# Patient Record
Sex: MALE | Race: WHITE | HISPANIC OR LATINO
[De-identification: names, ages, dates, MRNs, and addresses within clinical notes are randomized per-mention and may not be internally consistent; named-entity substitution may affect disease eponyms.]

---

## 2017-03-02 ENCOUNTER — APPOINTMENT (OUTPATIENT)
Dept: FAMILY MEDICINE | Facility: CLINIC | Age: 47
End: 2017-03-02

## 2017-12-12 ENCOUNTER — APPOINTMENT (OUTPATIENT)
Dept: FAMILY MEDICINE | Facility: CLINIC | Age: 47
End: 2017-12-12
Payer: COMMERCIAL

## 2017-12-12 VITALS
SYSTOLIC BLOOD PRESSURE: 129 MMHG | DIASTOLIC BLOOD PRESSURE: 74 MMHG | OXYGEN SATURATION: 98 % | HEART RATE: 88 BPM | BODY MASS INDEX: 30.29 KG/M2 | WEIGHT: 193 LBS | TEMPERATURE: 98.2 F | HEIGHT: 67 IN

## 2017-12-12 DIAGNOSIS — E66.3 OVERWEIGHT: ICD-10-CM

## 2017-12-12 DIAGNOSIS — M79.662 PAIN IN LEFT LOWER LEG: ICD-10-CM

## 2017-12-12 DIAGNOSIS — B35.3 TINEA PEDIS: ICD-10-CM

## 2017-12-12 DIAGNOSIS — L97.529 NON-PRESSURE CHRONIC ULCER OF OTHER PART OF LEFT FOOT WITH UNSPECIFIED SEVERITY: ICD-10-CM

## 2017-12-12 PROCEDURE — 99214 OFFICE O/P EST MOD 30 MIN: CPT | Mod: 25

## 2017-12-12 PROCEDURE — 90471 IMMUNIZATION ADMIN: CPT

## 2017-12-12 PROCEDURE — 90715 TDAP VACCINE 7 YRS/> IM: CPT

## 2017-12-12 PROCEDURE — 99396 PREV VISIT EST AGE 40-64: CPT | Mod: 25

## 2017-12-12 PROCEDURE — 93000 ELECTROCARDIOGRAM COMPLETE: CPT

## 2018-01-21 ENCOUNTER — FORM ENCOUNTER (OUTPATIENT)
Age: 48
End: 2018-01-21

## 2018-01-22 ENCOUNTER — APPOINTMENT (OUTPATIENT)
Dept: ULTRASOUND IMAGING | Facility: CLINIC | Age: 48
End: 2018-01-22
Payer: COMMERCIAL

## 2018-01-22 ENCOUNTER — OUTPATIENT (OUTPATIENT)
Dept: OUTPATIENT SERVICES | Facility: HOSPITAL | Age: 48
LOS: 1 days | End: 2018-01-22
Payer: COMMERCIAL

## 2018-01-22 DIAGNOSIS — M54.9 DORSALGIA, UNSPECIFIED: ICD-10-CM

## 2018-01-22 DIAGNOSIS — R07.9 CHEST PAIN, UNSPECIFIED: ICD-10-CM

## 2018-01-22 DIAGNOSIS — R10.13 EPIGASTRIC PAIN: ICD-10-CM

## 2018-01-22 PROCEDURE — 71046 X-RAY EXAM CHEST 2 VIEWS: CPT

## 2018-01-22 PROCEDURE — 76700 US EXAM ABDOM COMPLETE: CPT

## 2018-01-22 PROCEDURE — 72070 X-RAY EXAM THORAC SPINE 2VWS: CPT

## 2018-01-22 PROCEDURE — 71046 X-RAY EXAM CHEST 2 VIEWS: CPT | Mod: 26

## 2018-01-22 PROCEDURE — 72070 X-RAY EXAM THORAC SPINE 2VWS: CPT | Mod: 26

## 2018-01-22 PROCEDURE — 76700 US EXAM ABDOM COMPLETE: CPT | Mod: 26

## 2018-02-01 ENCOUNTER — APPOINTMENT (OUTPATIENT)
Dept: FAMILY MEDICINE | Facility: CLINIC | Age: 48
End: 2018-02-01

## 2018-03-08 ENCOUNTER — APPOINTMENT (OUTPATIENT)
Dept: FAMILY MEDICINE | Facility: CLINIC | Age: 48
End: 2018-03-08
Payer: COMMERCIAL

## 2018-03-08 VITALS
HEART RATE: 80 BPM | TEMPERATURE: 97.8 F | OXYGEN SATURATION: 97 % | DIASTOLIC BLOOD PRESSURE: 78 MMHG | BODY MASS INDEX: 30.29 KG/M2 | SYSTOLIC BLOOD PRESSURE: 137 MMHG | HEIGHT: 67 IN | WEIGHT: 193 LBS

## 2018-03-08 DIAGNOSIS — R94.31 ABNORMAL ELECTROCARDIOGRAM [ECG] [EKG]: ICD-10-CM

## 2018-03-08 DIAGNOSIS — M54.9 DORSALGIA, UNSPECIFIED: ICD-10-CM

## 2018-03-08 DIAGNOSIS — Z23 ENCOUNTER FOR IMMUNIZATION: ICD-10-CM

## 2018-03-08 DIAGNOSIS — Z87.19 PERSONAL HISTORY OF OTHER DISEASES OF THE DIGESTIVE SYSTEM: ICD-10-CM

## 2018-03-08 PROCEDURE — 99214 OFFICE O/P EST MOD 30 MIN: CPT

## 2018-03-10 PROBLEM — Z23 NEED FOR TDAP VACCINATION: Status: RESOLVED | Noted: 2017-12-12 | Resolved: 2018-03-10

## 2018-03-10 PROBLEM — M54.9 UPPER BACK PAIN: Status: RESOLVED | Noted: 2017-12-12 | Resolved: 2018-03-10

## 2018-03-10 PROBLEM — Z87.19 HISTORY OF EPIGASTRIC PAIN: Status: RESOLVED | Noted: 2017-12-12 | Resolved: 2018-03-10

## 2018-06-15 ENCOUNTER — APPOINTMENT (OUTPATIENT)
Dept: INTERNAL MEDICINE | Facility: CLINIC | Age: 48
End: 2018-06-15

## 2018-10-02 ENCOUNTER — APPOINTMENT (OUTPATIENT)
Dept: INTERNAL MEDICINE | Facility: CLINIC | Age: 48
End: 2018-10-02

## 2018-12-17 ENCOUNTER — APPOINTMENT (OUTPATIENT)
Dept: FAMILY MEDICINE | Facility: CLINIC | Age: 48
End: 2018-12-17
Payer: COMMERCIAL

## 2018-12-17 VITALS
HEART RATE: 86 BPM | WEIGHT: 192 LBS | HEIGHT: 67 IN | TEMPERATURE: 98 F | OXYGEN SATURATION: 99 % | SYSTOLIC BLOOD PRESSURE: 112 MMHG | DIASTOLIC BLOOD PRESSURE: 68 MMHG | BODY MASS INDEX: 30.13 KG/M2

## 2018-12-17 DIAGNOSIS — H92.01 OTALGIA, RIGHT EAR: ICD-10-CM

## 2018-12-17 DIAGNOSIS — Z87.898 PERSONAL HISTORY OF OTHER SPECIFIED CONDITIONS: ICD-10-CM

## 2018-12-17 DIAGNOSIS — M25.561 PAIN IN RIGHT KNEE: ICD-10-CM

## 2018-12-17 DIAGNOSIS — Z87.19 PERSONAL HISTORY OF OTHER DISEASES OF THE DIGESTIVE SYSTEM: ICD-10-CM

## 2018-12-17 DIAGNOSIS — H54.7 UNSPECIFIED VISUAL LOSS: ICD-10-CM

## 2018-12-17 PROCEDURE — 99214 OFFICE O/P EST MOD 30 MIN: CPT

## 2018-12-17 RX ORDER — AMOXICILLIN AND CLAVULANATE POTASSIUM 875; 125 MG/1; MG/1
875-125 TABLET, COATED ORAL
Qty: 14 | Refills: 0 | Status: COMPLETED | COMMUNITY
Start: 2018-03-08 | End: 2018-12-17

## 2018-12-17 NOTE — REVIEW OF SYSTEMS
[Heartburn] : heartburn [Muscle Pain] : muscle pain [Back Pain] : back pain [Negative] : Neurological [FreeTextEntry9] : see HPI

## 2018-12-17 NOTE — PHYSICAL EXAM
[No Acute Distress] : no acute distress [Well Nourished] : well nourished [Well Developed] : well developed [Well-Appearing] : well-appearing [No Respiratory Distress] : no respiratory distress  [Clear to Auscultation] : lungs were clear to auscultation bilaterally [No Accessory Muscle Use] : no accessory muscle use [Normal Rate] : normal rate  [Regular Rhythm] : with a regular rhythm [Normal S1, S2] : normal S1 and S2 [No Murmur] : no murmur heard [Soft] : abdomen soft [Non Tender] : non-tender [Non-distended] : non-distended [No Masses] : no abdominal mass palpated [No HSM] : no HSM [Normal Bowel Sounds] : normal bowel sounds [No Rash] : no rash [Normal Gait] : normal gait [Coordination Grossly Intact] : coordination grossly intact [de-identified] : obese [de-identified] : There is spinal point tenderness at L2-L3, no paraspinal musculature tenderness, SLR test negative, no pain elicited in LE extension/flexion/rotation. [de-identified] : Pain elicited by pressing at insertion site oF SCM muscle to Mastoid process L>R.

## 2018-12-17 NOTE — HISTORY OF PRESENT ILLNESS
[FreeTextEntry8] : Presents complaining  2 month h/o pain in the area immediately below the ears described as pinch like pain rated 5/10 when worse, felt not associated with any particular movement, has tried using one pillow instead of two with no real relief. Pt denies any otorrhea, denies any trauma. Pt does not take any medications, states that the pain comes and goes.\par Pt also complaining of pain in the calf muscles L>R in the last 2 months. 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 Pt feels relief when elevating his feet.\par Pt also complains of chronic low back pain which also comes and goes, pain rated 8/10 when worse, takes Advil Gel caps OTC (2 caps). Pt received in the past injection in the back (10 yr ago).

## 2018-12-17 NOTE — HEALTH RISK ASSESSMENT
[No falls in past year] : Patient reported no falls in the past year [0] : 2) Feeling down, depressed, or hopeless: Not at all (0) [] : No [de-identified] : social [[id number]] : 0

## 2019-04-03 ENCOUNTER — APPOINTMENT (OUTPATIENT)
Dept: FAMILY MEDICINE | Facility: CLINIC | Age: 49
End: 2019-04-03

## 2019-04-03 ENCOUNTER — APPOINTMENT (OUTPATIENT)
Dept: FAMILY MEDICINE | Facility: CLINIC | Age: 49
End: 2019-04-03
Payer: COMMERCIAL

## 2019-04-03 VITALS
TEMPERATURE: 97.5 F | BODY MASS INDEX: 29.19 KG/M2 | OXYGEN SATURATION: 97 % | WEIGHT: 186 LBS | RESPIRATION RATE: 18 BRPM | SYSTOLIC BLOOD PRESSURE: 115 MMHG | DIASTOLIC BLOOD PRESSURE: 75 MMHG | HEIGHT: 67 IN | HEART RATE: 88 BPM

## 2019-04-03 PROCEDURE — 99213 OFFICE O/P EST LOW 20 MIN: CPT

## 2019-04-09 NOTE — HISTORY OF PRESENT ILLNESS
[FreeTextEntry1] : medication refills [de-identified] : Pt presents for prescription refills, no new complains.

## 2019-04-09 NOTE — HEALTH RISK ASSESSMENT
[No falls in past year] : Patient reported no falls in the past year [0] : 2) Feeling down, depressed, or hopeless: Not at all (0) [] : No [de-identified] : social [[id number]] : 0

## 2019-04-09 NOTE — ASSESSMENT
[FreeTextEntry1] : will add Tizanidine for muscle spasm and renew Ibuprofen 800 mg tid prn\par Continue Omeprazole 40 mg daily\par \par Pt will return to the office for cpe, will give order for blood work to get done before appointment and to review at the time of his visit.\par \par HCM:\par CPE 12/12/2017\par EKG 12/12/2017\par Pt refused Flu vaccine.\par Tdap: 12//12/2017\par HIV testing consented\par Hep C testing consented\par F/U 3 months

## 2019-04-09 NOTE — PHYSICAL EXAM
[Well Nourished] : well nourished [No Acute Distress] : no acute distress [Well Developed] : well developed [No JVD] : no jugular venous distention [Well-Appearing] : well-appearing [Supple] : supple [No Lymphadenopathy] : no lymphadenopathy [Thyroid Normal, No Nodules] : the thyroid was normal and there were no nodules present [No Respiratory Distress] : no respiratory distress  [Clear to Auscultation] : lungs were clear to auscultation bilaterally [Normal Rate] : normal rate  [No Accessory Muscle Use] : no accessory muscle use [Regular Rhythm] : with a regular rhythm [Normal S1, S2] : normal S1 and S2 [Soft] : abdomen soft [No Murmur] : no murmur heard [Non Tender] : non-tender [Non-distended] : non-distended [de-identified] : decreased ROM of lumbar spine with increased tone of lumbar paraspinal musculature

## 2019-04-09 NOTE — COUNSELING
[Weight management counseling provided] : Weight management [Activity counseling provided] : activity [Healthy eating counseling provided] : healthy eating [___ min/wk activity recommended] : [unfilled] min/wk activity recommended [None] : None [Good understanding] : Patient has a good understanding of lifestyle changes and the steps needed to achieve self management goals

## 2019-04-17 ENCOUNTER — APPOINTMENT (OUTPATIENT)
Dept: FAMILY MEDICINE | Facility: CLINIC | Age: 49
End: 2019-04-17
Payer: COMMERCIAL

## 2019-05-01 ENCOUNTER — APPOINTMENT (OUTPATIENT)
Dept: FAMILY MEDICINE | Facility: CLINIC | Age: 49
End: 2019-05-01
Payer: COMMERCIAL

## 2019-05-01 VITALS
BODY MASS INDEX: 29.45 KG/M2 | OXYGEN SATURATION: 95 % | DIASTOLIC BLOOD PRESSURE: 71 MMHG | SYSTOLIC BLOOD PRESSURE: 115 MMHG | RESPIRATION RATE: 15 BRPM | HEART RATE: 78 BPM | WEIGHT: 188 LBS

## 2019-05-01 DIAGNOSIS — Z87.19 PERSONAL HISTORY OF OTHER DISEASES OF THE DIGESTIVE SYSTEM: ICD-10-CM

## 2019-05-01 DIAGNOSIS — Z11.4 ENCOUNTER FOR SCREENING FOR HUMAN IMMUNODEFICIENCY VIRUS [HIV]: ICD-10-CM

## 2019-05-01 LAB
GLUCOSE BLDC GLUCOMTR-MCNC: 94
HBA1C MFR BLD HPLC: 6.5

## 2019-05-01 PROCEDURE — 99396 PREV VISIT EST AGE 40-64: CPT | Mod: 25

## 2019-05-01 PROCEDURE — 82947 ASSAY GLUCOSE BLOOD QUANT: CPT | Mod: QW

## 2019-05-01 PROCEDURE — 83036 HEMOGLOBIN GLYCOSYLATED A1C: CPT | Mod: QW

## 2019-05-01 NOTE — HEALTH RISK ASSESSMENT
[Good] : ~his/her~  mood as  good [No falls in past year] : Patient reported no falls in the past year [0] : 2) Feeling down, depressed, or hopeless: Not at all (0) [With Family] : lives with family [None] : None [Employed] : employed [# of Members in Household ___] :  household currently consist of [unfilled] member(s) [High School] : high school [] :  [# Of Children ___] : has [unfilled] children [Sexually Active] : sexually active [Neglect Or Abandonment] : neglect or abandonment [Fully functional (bathing, dressing, toileting, transferring, walking, feeding)] : Fully functional (bathing, dressing, toileting, transferring, walking, feeding) [Feels Safe at Home] : Feels safe at home [Fully functional (using the telephone, shopping, preparing meals, housekeeping, doing laundry, using] : Fully functional and needs no help or supervision to perform IADLs (using the telephone, shopping, preparing meals, housekeeping, doing laundry, using transportation, managing medications and managing finances) [Smoke Detector] : smoke detector [Carbon Monoxide Detector] : carbon monoxide detector [Seat Belt] :  uses seat belt [Patient/Caregiver unclear of wishes] : Patient/Caregiver unclear of wishes [] : No [de-identified] : None [de-identified] : occasional whiskey 1 glass every week [de-identified] : Regular [Change in mental status noted] : No change in mental status noted [[id number]] : 0 [Behavior] : denies difficulty with behavior [Learning/Retaining New Information] : denies difficulty learning/retaining new information [Language] : denies difficulty with language [Reasoning] : denies difficulty with reasoning [Handling Complex Tasks] : denies difficulty handling complex tasks [High Risk Behavior] : no high risk behavior [Spatial Ability and Orientation] : denies difficulty with spatial ability and orientation [Reports changes in hearing] : Reports no changes in hearing [Reports normal functional visual acuity (ie: able to read med bottle)] : Reports poor functional visual acuity.  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

## 2019-05-01 NOTE — COUNSELING
[Healthy eating counseling provided] : healthy eating [Weight management counseling provided] : Weight management [Activity counseling provided] : activity [Low Fat Diet] : Low fat diet [Low Salt Diet] : Low salt diet [___ min/wk activity recommended] : [unfilled] min/wk activity recommended [Good understanding] : Patient has a good understanding of lifestyle changes and the steps needed to achieve self management goals [None] : None

## 2019-05-01 NOTE — ASSESSMENT
[FreeTextEntry1] : This is a 50 y/o male with PMHx significant for chronic low back pain, GERD, HLD, Obesity, Vitamin D insufficiency, presenting for CPE. Pt had blood work done 3 weeks ago in preparation for exam.\par \par MSK: Bilateral neck pain\par -Resolved, most likely muscle strain from head positioning at sleeping time.\par -Rx refill for Tizanidine was sent to pharmacy.\par -C-spine Xray ordered but never done..\par -L-spine Xray ordered, not done.\par \par GERD/Epigastric pain: h/o +H. Pylori\par -s/p 2 week trial of Omeprazole\par -Dietary changes discussed.\par -Doing well after Omeprazole trial.\par \par Obesity/ Pre-DM:\par -HgA1c 5/7 1/2018, 5.6 4/19, non-fasting BS 98.\par -Weight loss encouraged\par -Dietary changes discussed.\par \par [facility]: HLD\par -Lipid panel wnl\par -Advised low fat/low cholesterol diet and weight loss\par \par F/E/N: Vitamin D defficiency\par -Recommend increase Vitamin D oral intake.\par \par HCM:\par -EKG 12/12/2017\par -Depression screening: negative.\par -Pt refuses Flu vaccine.\par -Tdap: 12//12/2017\par -HIV testing offered: he declined testing 12/12/2017\par -F/U prn

## 2019-05-01 NOTE — HISTORY OF PRESENT ILLNESS
[de-identified] : This is a 50 y/o male with PMHx significant for chronic low back pain, GERD, HLD, Obesity, Vitamin D insufficiency, presenting for CPE. Pt had blood work done 3 weeks ago in preparation for exam. Pt offers no new complains. [FreeTextEntry1] : physical exam

## 2019-05-01 NOTE — PHYSICAL EXAM
[No Acute Distress] : no acute distress [Well Nourished] : well nourished [Well Developed] : well developed [Well-Appearing] : well-appearing [Normal Sclera/Conjunctiva] : normal sclera/conjunctiva [Normal Outer Ear/Nose] : the outer ears and nose were normal in appearance [EOMI] : extraocular movements intact [PERRL] : pupils equal round and reactive to light [Supple] : supple [No JVD] : no jugular venous distention [Normal Oropharynx] : the oropharynx was normal [No Lymphadenopathy] : no lymphadenopathy [No Respiratory Distress] : no respiratory distress  [Thyroid Normal, No Nodules] : the thyroid was normal and there were no nodules present [Normal Rate] : normal rate  [Clear to Auscultation] : lungs were clear to auscultation bilaterally [No Accessory Muscle Use] : no accessory muscle use [No Murmur] : no murmur heard [Normal S1, S2] : normal S1 and S2 [Regular Rhythm] : with a regular rhythm [No Varicosities] : no varicosities [No Carotid Bruits] : no carotid bruits [No Abdominal Bruit] : a ~M bruit was not heard ~T in the abdomen [Pedal Pulses Present] : the pedal pulses are present [No Extremity Clubbing/Cyanosis] : no extremity clubbing/cyanosis [No Edema] : there was no peripheral edema [Soft] : abdomen soft [No Palpable Aorta] : no palpable aorta [Non-distended] : non-distended [Non Tender] : non-tender [No HSM] : no HSM [Normal Bowel Sounds] : normal bowel sounds [Declined Rectal Exam] : declined rectal exam [No Masses] : no abdominal mass palpated [Normal Posterior Cervical Nodes] : no posterior cervical lymphadenopathy [No CVA Tenderness] : no CVA  tenderness [Normal Anterior Cervical Nodes] : no anterior cervical lymphadenopathy [No Joint Swelling] : no joint swelling [No Spinal Tenderness] : no spinal tenderness [No Rash] : no rash [Normal Gait] : normal gait [Grossly Normal Strength/Tone] : grossly normal strength/tone [Normal Affect] : the affect was normal [Deep Tendon Reflexes (DTR)] : deep tendon reflexes were 2+ and symmetric [Coordination Grossly Intact] : coordination grossly intact [No Focal Deficits] : no focal deficits [Normal Insight/Judgement] : insight and judgment were intact

## 2019-06-04 ENCOUNTER — MEDICATION RENEWAL (OUTPATIENT)
Age: 49
End: 2019-06-04

## 2019-06-27 ENCOUNTER — APPOINTMENT (OUTPATIENT)
Dept: FAMILY MEDICINE | Facility: CLINIC | Age: 49
End: 2019-06-27
Payer: COMMERCIAL

## 2019-06-27 VITALS
HEART RATE: 96 BPM | DIASTOLIC BLOOD PRESSURE: 81 MMHG | TEMPERATURE: 97.5 F | BODY MASS INDEX: 29.03 KG/M2 | WEIGHT: 185 LBS | HEIGHT: 67 IN | SYSTOLIC BLOOD PRESSURE: 129 MMHG | OXYGEN SATURATION: 98 %

## 2019-06-27 DIAGNOSIS — Z11.59 ENCOUNTER FOR SCREENING FOR OTHER VIRAL DISEASES: ICD-10-CM

## 2019-06-27 DIAGNOSIS — Z86.39 PERSONAL HISTORY OF OTHER ENDOCRINE, NUTRITIONAL AND METABOLIC DISEASE: ICD-10-CM

## 2019-06-27 LAB
BILIRUB UR QL STRIP: NORMAL
GLUCOSE UR-MCNC: NORMAL
HCG UR QL: 0.2 EU/DL
HGB UR QL STRIP.AUTO: NORMAL
KETONES UR-MCNC: NORMAL
LEUKOCYTE ESTERASE UR QL STRIP: NORMAL
NITRITE UR QL STRIP: NORMAL
PH UR STRIP: 6.5
PROT UR STRIP-MCNC: NORMAL
SP GR UR STRIP: 1.02

## 2019-06-27 PROCEDURE — 81003 URINALYSIS AUTO W/O SCOPE: CPT | Mod: QW

## 2019-06-27 PROCEDURE — 99213 OFFICE O/P EST LOW 20 MIN: CPT | Mod: 25

## 2019-06-27 RX ORDER — IBUPROFEN 800 MG/1
800 TABLET, FILM COATED ORAL 3 TIMES DAILY
Qty: 30 | Refills: 1 | Status: DISCONTINUED | COMMUNITY
Start: 2018-12-17 | End: 2019-06-27

## 2019-06-27 NOTE — PHYSICAL EXAM
[No Acute Distress] : no acute distress [Well Nourished] : well nourished [Well Developed] : well developed [Well-Appearing] : well-appearing [No Respiratory Distress] : no respiratory distress  [Clear to Auscultation] : lungs were clear to auscultation bilaterally [Normal Rate] : normal rate  [No Accessory Muscle Use] : no accessory muscle use [Regular Rhythm] : with a regular rhythm [Normal S1, S2] : normal S1 and S2 [Soft] : abdomen soft [No Murmur] : no murmur heard [Flat] : flat [Suprapubic] : in the suprapubic area [No Mass] : no masses were palpated [Urethral Meatus] : meatus normal [Penis Abnormality] : normal uncircumcised penis [Scrotum] : the scrotum was normal [FreeTextEntry1] : Slight LEFT testicular tenderness, + periepididymitis of RIGHT testicle.

## 2019-06-27 NOTE — ASSESSMENT
[FreeTextEntry1] : This is a 48 y/o male with PMHx significant for chronic low back pain, GERD, HLD, Obesity, Vitamin D insufficiency, presenting c/o suprapubic / groin / testicular pain.\par \par : c/o suprapubic / groin / testicular pain.\par -Pelvic CT scan to r/o hernia\par -UA negative\par -Naproxen 500 mg for pain, do not take Ibuprofen.\par \par MSK: Bilateral neck pain\par -Resolved, most likely muscle strain from head positioning at sleeping time.\par -Rx refill for Tizanidine was sent to pharmacy.\par -C-spine Xray ordered but never done..\par -L-spine Xray ordered, not done.\par \par GERD/Epigastric pain: h/o +H. Pylori\par -s/p 2 week trial of Omeprazole\par -Dietary changes discussed.\par -Doing well after Omeprazole trial.\par \par Obesity/ Pre-DM:\par -HgA1c 5/7 1/2018, 5.6 4/19, non-fasting BS 98.\par -Weight loss encouraged\par -Dietary changes discussed.\par \par [facility]: HLD\par -Lipid panel wnl\par -Advised low fat/low cholesterol diet and weight loss\par \par F/E/N: Vitamin D deficiency\par -Recommend increase Vitamin D oral intake.\par \par HCM:\par -EKG 12/12/2017\par -Depression screening: negative.\par -Pt refuses Flu vaccine.\par -Tdap: 12//12/2017\par -HIV testing offered: he declined testing 12/12/2017\par -F/U prn

## 2019-06-27 NOTE — HEALTH RISK ASSESSMENT
[Monthly or less (1 pt)] : Monthly or less (1 point) [No] : In the past 12 months have you used drugs other than those required for medical reasons? No [Never (0 pts)] : Never (0 points) [1 or 2 (0 pts)] : 1 or 2 (0 points) [0] : 2) Feeling down, depressed, or hopeless: Not at all (0) [No falls in past year] : Patient reported no falls in the past year [] : No [de-identified] : occasional whiskey 1 glass every week [Audit-CScore] : 1 [de-identified] : None [[id number]] : 0 [de-identified] : Regular

## 2019-06-27 NOTE — HISTORY OF PRESENT ILLNESS
[FreeTextEntry8] : This is a 48 y/o male with PMHx significant for chronic low back pain, GERD, HLD, Obesity, Vitamin D insufficiency, presenting c/o 2 week h/o suprapubic pain, rated 7/10 with no aggravating or relieving factors. Pt denies any heavy lifting, no urinary symptoms, denies any sy hematuria. Pt admits to Testicular pain at the time of the suprapubic pain which lasted 2 days, today with similar complains.

## 2019-06-27 NOTE — COUNSELING
[Weight management counseling provided] : Weight management [Healthy eating counseling provided] : healthy eating [Activity counseling provided] : activity [Low Fat Diet] : Low fat diet [Low Salt Diet] : Low salt diet [None] : None

## 2019-08-21 ENCOUNTER — APPOINTMENT (OUTPATIENT)
Dept: FAMILY MEDICINE | Facility: CLINIC | Age: 49
End: 2019-08-21
Payer: COMMERCIAL

## 2019-08-21 VITALS
WEIGHT: 185 LBS | TEMPERATURE: 98.4 F | HEIGHT: 67 IN | OXYGEN SATURATION: 97 % | HEART RATE: 86 BPM | DIASTOLIC BLOOD PRESSURE: 66 MMHG | SYSTOLIC BLOOD PRESSURE: 102 MMHG | BODY MASS INDEX: 29.03 KG/M2

## 2019-08-21 PROCEDURE — 99213 OFFICE O/P EST LOW 20 MIN: CPT

## 2019-08-21 NOTE — ASSESSMENT
[FreeTextEntry1] : This is a 50 y/o male with PMHx significant for chronic low back pain, GERD, HLD, Obesity, Vitamin D insufficiency, presenting c/o neck pain.\par \par : c/o suprapubic / groin / testicular pain.\par -Pelvic CT scan to r/o hernia was denied by insurance\par -UA negative\par -Naproxen 500 mg for pain, which resolved.\par \par MSK: Bilateral neck pain\par -Resolved, most likely muscle strain from head positioning at sleeping time / weight lifting training.\par -Rx refill for Tizanidine was sent to pharmacy as well as Naproxen refill.\par -C-spine Xray ordered but never done.\par -L-spine Xray ordered, not done.\par \par GERD/Epigastric pain: h/o +H. Pylori\par -s/p 2 week trial of Omeprazole\par -Dietary changes discussed.\par -Doing well after Omeprazole trial.\par \par Obesity/ Pre-DM:\par -HgA1c 5/7 1/2018, 5.6 4/19, non-fasting BS 98.\par -Weight loss encouraged\par -Dietary changes discussed.\par \par [facility]: HLD\par -Lipid panel wnl\par -Advised low fat/low cholesterol diet and weight loss\par \par F/E/N: Vitamin D deficiency\par -Recommend increase Vitamin D oral intake.\par \par HCM:\par -EKG 12/12/2017\par -Depression screening: negative.\par -Pt refuses Flu vaccine.\par -Tdap: 12//12/2017\par -HIV testing offered: he declined testing 12/12/2017\par -F/U prn

## 2019-08-21 NOTE — HISTORY OF PRESENT ILLNESS
[FreeTextEntry8] : c/o 2 week h/o neck pain which started after initiating weight training. Pt denies any trauma, pain not radiating, denies any nausea, dizziness, headaches. Pain rated 10/10 when worse but has been diminishing steadily down to 3/10 worsening when turning head laterally. Pt had been taking muscle relaxant with good relief.

## 2019-08-21 NOTE — PHYSICAL EXAM
[Normal] : no rash [de-identified] : slight increase muscle tone in neck musculature, FROM with minimal degree of pain / discomfort on head lateral rotation.

## 2019-08-21 NOTE — COUNSELING
[Weight management counseling provided] : Weight management [Healthy eating counseling provided] : healthy eating [Activity counseling provided] : activity [Low Salt Diet] : Low salt diet [Low Fat Diet] : Low fat diet [None] : None

## 2019-08-21 NOTE — HEALTH RISK ASSESSMENT
[Monthly or less (1 pt)] : Monthly or less (1 point) [1 or 2 (0 pts)] : 1 or 2 (0 points) [Never (0 pts)] : Never (0 points) [No falls in past year] : Patient reported no falls in the past year [No] : In the past 12 months have you used drugs other than those required for medical reasons? No [0] : 1) Little interest or pleasure doing things: Not at all (0) [] : No [de-identified] : occasional whiskey 1 glass every week [Audit-CScore] : 1 [de-identified] : None [de-identified] : Regular [[id number]] : 0

## 2020-06-02 ENCOUNTER — APPOINTMENT (OUTPATIENT)
Dept: FAMILY MEDICINE | Facility: CLINIC | Age: 50
End: 2020-06-02
Payer: MEDICAID

## 2020-06-02 VITALS
DIASTOLIC BLOOD PRESSURE: 92 MMHG | WEIGHT: 200 LBS | OXYGEN SATURATION: 98 % | HEIGHT: 67 IN | BODY MASS INDEX: 31.39 KG/M2 | TEMPERATURE: 97.6 F | SYSTOLIC BLOOD PRESSURE: 144 MMHG | HEART RATE: 82 BPM

## 2020-06-02 DIAGNOSIS — R10.32 LEFT LOWER QUADRANT PAIN: ICD-10-CM

## 2020-06-02 DIAGNOSIS — R10.2 PELVIC AND PERINEAL PAIN: ICD-10-CM

## 2020-06-02 DIAGNOSIS — M54.5 LOW BACK PAIN: ICD-10-CM

## 2020-06-02 DIAGNOSIS — G89.29 LOW BACK PAIN: ICD-10-CM

## 2020-06-02 DIAGNOSIS — M54.2 CERVICALGIA: ICD-10-CM

## 2020-06-02 PROCEDURE — 36415 COLL VENOUS BLD VENIPUNCTURE: CPT

## 2020-06-02 PROCEDURE — 99214 OFFICE O/P EST MOD 30 MIN: CPT | Mod: 25

## 2020-06-02 RX ORDER — TIZANIDINE 2 MG/1
2 TABLET ORAL
Qty: 15 | Refills: 1 | Status: COMPLETED | COMMUNITY
Start: 2019-04-03 | End: 2020-06-02

## 2020-06-02 NOTE — HEALTH RISK ASSESSMENT
[1 or 2 (0 pts)] : 1 or 2 (0 points) [Monthly or less (1 pt)] : Monthly or less (1 point) [Never (0 pts)] : Never (0 points) [No] : In the past 12 months have you used drugs other than those required for medical reasons? No [No falls in past year] : Patient reported no falls in the past year [0] : 2) Feeling down, depressed, or hopeless: Not at all (0) [] : No [de-identified] : occasional whiskey 1 glass every week [de-identified] : None [Audit-CScore] : 1 [de-identified] : Regular [[id number]] : 0

## 2020-06-02 NOTE — ASSESSMENT
[FreeTextEntry1] : This is a 50 y/o male with PMHx significant for chronic low back pain, GERD, HLD, Obesity, Vitamin D insufficiency, presenting c/o .\par \par : abdominal / epigastric pain\par -Basically resolved\par -Omeprazole 40 mg qd x 2 weeks ordered.\par -Dietary changes, no eating after 3 hours prior to bedtime.\par \par MSK: LEFT Knee pain, LEFT finger mass.\par -Knee pain positional, recommend to not cross knees to avoid unnecessary stress to knee.\par -RIGHT hand Xray to r/o foreign object vs mass.\par \par Obesity/ Pre-DM:\par -HgA1c 5/7 1/2018, 5.6 4/19, non-fasting BS 98.\par -Will check A1c with Estimated Average Glucose\par -Weight loss encouraged\par -Dietary changes discussed.\par \par [facility]: HLD\par -Lipid panel fasting ordered today.\par -Advised low fat/low cholesterol diet and weight loss\par \par F/E/N: Vitamin D deficiency\par -Recommend increase Vitamin D oral intake.\par -Will check vitamin D level today.\par \par HCM:\par -EKG 12/12/2017\par -Depression screening: negative.\par -Pt refused Flu vaccine.\par -Tdap: 12//12/2017\par -HIV testing offered: he declined testing 12/12/2017\par -Covid-19 Ab testing today in office.

## 2020-06-02 NOTE — HISTORY OF PRESENT ILLNESS
[FreeTextEntry8] : Presents c/o abdominal pain in the epigastric area, pain/burning sensation, took medication from [place] which resolved the problem for a couple of days, took Catherine [place] also with relief, no further pain x 3 days which coincide with him going back to work. Pt also complaining of "a mass" at the base of the RIGHT fourth digit, non painful. Pt also c/o RIGHT knee pain specifically when crossing his leg while sitting down, pt denies any trauma to the knee. Pt denies known exposure to Covid-19.

## 2020-06-02 NOTE — REVIEW OF SYSTEMS
[Abdominal Pain] : abdominal pain [Heartburn] : heartburn [Joint Pain] : joint pain [Negative] : Integumentary

## 2020-06-02 NOTE — COUNSELING
[AUDIT-C Screening administered and reviewed] : AUDIT-C Screening administered and reviewed [Decrease Portions] : decrease portions [____ min/wk Activity] : [unfilled] min/wk activity

## 2020-06-02 NOTE — ASSESSMENT
[FreeTextEntry1] : This is a 48 y/o male with PMHx significant for chronic low back pain, GERD, HLD, Obesity, Vitamin D insufficiency, presenting c/o .\par \par : abdominal / epigastric pain\par -Basically resolved\par -Omeprazole 40 mg qd x 2 weeks ordered.\par -Dietary changes, no eating after 3 hours prior to bedtime.\par \par MSK: LEFT Knee pain, LEFT finger mass.\par -Knee pain positional, recommend to not cross knees to avoid unnecessary stress to knee.\par -RIGHT hand Xray to r/o foreign object vs mass.\par \par Obesity/ Pre-DM:\par -HgA1c 5/7 1/2018, 5.6 4/19, non-fasting BS 98.\par -Will check A1c with Estimated Average Glucose\par -Weight loss encouraged\par -Dietary changes discussed.\par \par [facility]: HLD\par -Lipid panel fasting ordered today.\par -Advised low fat/low cholesterol diet and weight loss\par \par F/E/N: Vitamin D deficiency\par -Recommend increase Vitamin D oral intake.\par -Will check vitamin D level today.\par \par HCM:\par -EKG 12/12/2017\par -Depression screening: negative.\par -Pt refused Flu vaccine.\par -Tdap: 12//12/2017\par -HIV testing offered: he declined testing 12/12/2017\par -Covid-19 Ab testing today in office.

## 2020-06-02 NOTE — HISTORY OF PRESENT ILLNESS
[FreeTextEntry8] : Presents c/o abdominal pain in the epigastric area, pain/burning sensation, took medication from [place] which resolved the problem for a couple of days, took Catherine [place] also with relief, no further pain x 3 days which coincide with him going back to work. Pt also complaining of "a mass" at the base of the RIGHT fourth digit, non painful. Pt also c/o RIGHT knee pain specifically when crossing his leg while sitting down, pt denies any trauma to the knee. Pt denies known exposure to Covid-19.

## 2020-06-02 NOTE — HEALTH RISK ASSESSMENT
[1 or 2 (0 pts)] : 1 or 2 (0 points) [Monthly or less (1 pt)] : Monthly or less (1 point) [Never (0 pts)] : Never (0 points) [No] : In the past 12 months have you used drugs other than those required for medical reasons? No [No falls in past year] : Patient reported no falls in the past year [0] : 2) Feeling down, depressed, or hopeless: Not at all (0) [] : No [de-identified] : occasional whiskey 1 glass every week [Audit-CScore] : 1 [de-identified] : None [de-identified] : Regular [[id number]] : 0

## 2020-06-02 NOTE — PHYSICAL EXAM
[No Joint Swelling] : no joint swelling [Grossly Normal Strength/Tone] : grossly normal strength/tone [Normal] : no rash [de-identified] : Small roud solid mass  at the medial aspect of the base of the RIGHT fourth digit, mobile, well defined borders.

## 2020-06-04 LAB
25(OH)D3 SERPL-MCNC: 26.5 NG/ML
CHOLEST SERPL-MCNC: 207 MG/DL
CHOLEST/HDLC SERPL: 3.1 RATIO
ESTIMATED AVERAGE GLUCOSE: 123 MG/DL
HBA1C MFR BLD HPLC: 5.9 %
HDLC SERPL-MCNC: 67 MG/DL
LDLC SERPL CALC-MCNC: 126 MG/DL
SARS-COV-2 IGG SERPL IA-ACNC: <0.1 INDEX
SARS-COV-2 IGG SERPL QL IA: NEGATIVE
TRIGL SERPL-MCNC: 70 MG/DL

## 2020-08-19 ENCOUNTER — APPOINTMENT (OUTPATIENT)
Dept: FAMILY MEDICINE | Facility: CLINIC | Age: 50
End: 2020-08-19
Payer: MEDICAID

## 2020-08-19 VITALS
BODY MASS INDEX: 30.76 KG/M2 | HEART RATE: 72 BPM | DIASTOLIC BLOOD PRESSURE: 72 MMHG | WEIGHT: 196 LBS | HEIGHT: 67 IN | TEMPERATURE: 98.3 F | OXYGEN SATURATION: 99 % | SYSTOLIC BLOOD PRESSURE: 120 MMHG | RESPIRATION RATE: 16 BRPM

## 2020-08-19 PROCEDURE — 99214 OFFICE O/P EST MOD 30 MIN: CPT

## 2020-08-20 NOTE — HEALTH RISK ASSESSMENT
[1 or 2 (0 pts)] : 1 or 2 (0 points) [Monthly or less (1 pt)] : Monthly or less (1 point) [No] : In the past 12 months have you used drugs other than those required for medical reasons? No [Never (0 pts)] : Never (0 points) [No falls in past year] : Patient reported no falls in the past year [0] : 1) Little interest or pleasure doing things: Not at all (0) [] : No [Audit-CScore] : 1 [de-identified] : occasional whiskey 1 glass every week [de-identified] : Regular [[id number]] : 0 [de-identified] : None

## 2020-08-20 NOTE — REVIEW OF SYSTEMS
[Diarrhea] : diarrhea [Abdominal Pain] : abdominal pain [Negative] : Musculoskeletal [Vomiting] : no vomiting [Constipation] : no constipation [Heartburn] : no heartburn [Melena] : no melena

## 2020-08-20 NOTE — HISTORY OF PRESENT ILLNESS
[FreeTextEntry8] : Pt c/o 3 day h/o watery soft stool diarrhea which stopped yesterday. Pt states that has had pain rated 10/10 colic-like pain in the mid right abdominal area described "as if I had to go to the bathroom". Pt denies taking any medication, has been eating normal, does not recall any food out of the ordinary except a fish soup 3 days ago. Pt has appointment with GI on 9/8.

## 2020-08-20 NOTE — ASSESSMENT
[FreeTextEntry1] : This is a 50 y/o male with PMHx significant for chronic low back pain, GERD, HLD, Obesity, Vitamin D insufficiency, presenting c/o abdominal pain and episode of diarrhea x 3 days. .\par \par : abdominal / epigastric pain\par -? Viral Gasroenteritis resolved\par -Start Dicyclomine 10 mg tid prn\par -Keep GI appointment 9/8 with Dr Willson.\par -Off Omeprazole\par -Dietary changes, no eating after 3 hours prior to bedtime.\par \par Obesity/ Pre-DM:\par -HgA1c 5/7 1/2018, 5.6 4/19, non-fasting BS 98.\par - A1c with Estimated Average Glucose 5.9 / 123\par -Weight loss encouraged\par -Dietary changes discussed again.\par \par [facility]: HLD\par -Lipid panel showed , Total Cholesterol 207, Trig 70, HDL 67.\par -Advised low fat/low cholesterol diet and weight loss\par \par F/E/N: Vitamin D insufficiency.\par -Recommend increase Vitamin D oral intake.\par \par HCM:\par -EKG 12/12/2017\par -Depression screening: negative.\par -Pt refused Flu vaccine.\par -Tdap: 12//12/2017\par -HIV testing offered: he declined testing 12/12/2017\par -Covid-19 Ab testing Negative.

## 2020-08-20 NOTE — PHYSICAL EXAM
[Soft, Nontender] : the abdomen was soft and nontender [Flat] : flat [Firm] : firm [Normal] : normal to percussion [None] : no hernias were palpable [Christian's] : a negative Christian's sign [Liver Enlarged] : not enlarged [Liver Tender To Palpation] : not tender [Rovsing's] : a negative Rovsing's sign

## 2020-08-20 NOTE — COUNSELING
[AUDIT-C Screening administered and reviewed] : AUDIT-C Screening administered and reviewed [Decrease Portions] : decrease portions [____ min/wk Activity] : [unfilled] min/wk activity [None] : None [Good understanding] : Patient has a good understanding of lifestyle changes and steps needed to achieve self management goal

## 2020-09-08 ENCOUNTER — APPOINTMENT (OUTPATIENT)
Dept: GASTROENTEROLOGY | Facility: CLINIC | Age: 50
End: 2020-09-08

## 2020-11-17 ENCOUNTER — APPOINTMENT (OUTPATIENT)
Dept: FAMILY MEDICINE | Facility: CLINIC | Age: 50
End: 2020-11-17
Payer: MEDICAID

## 2020-11-17 VITALS
HEIGHT: 67 IN | HEART RATE: 76 BPM | DIASTOLIC BLOOD PRESSURE: 76 MMHG | WEIGHT: 195 LBS | TEMPERATURE: 96.7 F | OXYGEN SATURATION: 99 % | BODY MASS INDEX: 30.61 KG/M2 | SYSTOLIC BLOOD PRESSURE: 122 MMHG | RESPIRATION RATE: 14 BRPM

## 2020-11-17 DIAGNOSIS — Z20.828 CONTACT WITH AND (SUSPECTED) EXPOSURE TO OTHER VIRAL COMMUNICABLE DISEASES: ICD-10-CM

## 2020-11-17 PROCEDURE — 36415 COLL VENOUS BLD VENIPUNCTURE: CPT

## 2020-11-17 PROCEDURE — 99214 OFFICE O/P EST MOD 30 MIN: CPT | Mod: 25

## 2020-11-17 RX ORDER — DICYCLOMINE HYDROCHLORIDE 10 MG/1
10 CAPSULE ORAL 3 TIMES DAILY
Qty: 30 | Refills: 0 | Status: COMPLETED | COMMUNITY
Start: 2020-08-19 | End: 2020-11-17

## 2020-11-17 NOTE — HISTORY OF PRESENT ILLNESS
[FreeTextEntry8] : This is a 49 y/o male with PMHx significant for chronic low back pain, GERD, HLD, Obesity, Vitamin D insufficiency, presenting c/o continued low abdominal pain. Pt states that he had EGD performed last month (Dr Bianchi -record scanned), no acute findings and continued to have the pain/discomfort until he started using Miralax daily and noticed that when having more complete BMs his discomfort would improve. Pt states that he took Miralax for about a week and then stopped as he wanted to discuss it with us prior to continuing. Pt also states that he is awaiting approval for his bowel regimen to be able to get Colonoscopy done with Dr Bianchi.

## 2020-11-17 NOTE — ASSESSMENT
[FreeTextEntry1] : This is a 51 y/o male with PMHx significant for chronic low back pain, GERD, HLD, Obesity, Vitamin D insufficiency, presenting c/o abdominal pain discomfort.\par \par : abdominal pain\par -Improved while on Miralax, continue 1 pkt daily prn\par -Abdominal Xray ordered.\par -Omeprazole 40 mg refilled, to take prn\par -Dietary changes, no eating after 3 hours prior to bedtime.\par \par Obesity/ Pre-DM:\par - A1c with Estimated Average Glucose 5.9 / 123 in June\par -Weight loss encouraged\par -Dietary changes discussed again.\par \par [facility]: HLD\par -Lipid panel showed , Total Cholesterol 207, Trig 70, HDL 67.\par -Advised low fat/low cholesterol diet and weight loss, will check fasting lipids today.\par \par F/E/N: Vitamin D insufficiency.\par -Recommend increase Vitamin D oral intake.\par -Will check Vitamin D today.\par \par HCM:\par -EKG 12/12/2017\par -Depression screening: negative.\par -Pt refused Flu vaccine.\par -Tdap: 12//12/2017\par -HIV testing offered: he declined testing 12/12/2017\par -Covid-19 Ab testing Negative.\par -Schedule CPE  at earliest convenience.

## 2020-11-17 NOTE — HEALTH RISK ASSESSMENT
[Monthly or less (1 pt)] : Monthly or less (1 point) [1 or 2 (0 pts)] : 1 or 2 (0 points) [Never (0 pts)] : Never (0 points) [No] : In the past 12 months have you used drugs other than those required for medical reasons? No [No falls in past year] : Patient reported no falls in the past year [0] : 2) Feeling down, depressed, or hopeless: Not at all (0) [] : No [de-identified] : occasional whiskey 1 glass every week [Audit-CScore] : 1 [de-identified] : None [de-identified] : Regular [[id number]] : 0

## 2020-12-01 LAB
25(OH)D3 SERPL-MCNC: 23.1 NG/ML
CHOLEST SERPL-MCNC: 197 MG/DL
ESTIMATED AVERAGE GLUCOSE: 123 MG/DL
HBA1C MFR BLD HPLC: 5.9 %
HDLC SERPL-MCNC: 68 MG/DL
LDLC SERPL CALC-MCNC: 116 MG/DL
NONHDLC SERPL-MCNC: 129 MG/DL
TRIGL SERPL-MCNC: 68 MG/DL

## 2021-01-06 ENCOUNTER — APPOINTMENT (OUTPATIENT)
Dept: FAMILY MEDICINE | Facility: CLINIC | Age: 51
End: 2021-01-06
Payer: MEDICAID

## 2021-01-06 VITALS
TEMPERATURE: 97.2 F | RESPIRATION RATE: 14 BRPM | OXYGEN SATURATION: 98 % | HEART RATE: 75 BPM | WEIGHT: 198 LBS | BODY MASS INDEX: 31.08 KG/M2 | SYSTOLIC BLOOD PRESSURE: 120 MMHG | HEIGHT: 67 IN | DIASTOLIC BLOOD PRESSURE: 72 MMHG

## 2021-01-06 PROCEDURE — 99072 ADDL SUPL MATRL&STAF TM PHE: CPT

## 2021-01-06 PROCEDURE — 99396 PREV VISIT EST AGE 40-64: CPT

## 2021-01-06 RX ORDER — NAPROXEN 500 MG/1
500 TABLET ORAL
Qty: 30 | Refills: 1 | Status: COMPLETED | COMMUNITY
Start: 2019-06-27 | End: 2021-01-06

## 2021-01-06 NOTE — COUNSELING
[Fall prevention counseling provided] : Fall prevention counseling provided [Adequate lighting] : Adequate lighting [No throw rugs] : No throw rugs [Use proper foot wear] : Use proper foot wear [Behavioral health counseling provided] : Behavioral health counseling provided [Sleep ___ hours/day] : Sleep [unfilled] hours/day [AUDIT-C Screening administered and reviewed] : AUDIT-C Screening administered and reviewed [Benefits of weight loss discussed] : Benefits of weight loss discussed [Encouraged to increase physical activity] : Encouraged to increase physical activity [Decrease Portions] : decrease portions [____ min/wk Activity] : [unfilled] min/wk activity [None] : None [Good understanding] : Patient has a good understanding of lifestyle changes and steps needed to achieve self management goal

## 2021-01-07 NOTE — HEALTH RISK ASSESSMENT
[Good] : ~his/her~  mood as  good [Yes] : Yes [2 - 4 times a month (2 pts)] : 2-4 times a month (2 points) [1 or 2 (0 pts)] : 1 or 2 (0 points) [Never (0 pts)] : Never (0 points) [No] : In the past 12 months have you used drugs other than those required for medical reasons? No [No falls in past year] : Patient reported no falls in the past year [0] : 2) Feeling down, depressed, or hopeless: Not at all (0) [HIV test declined] : HIV test declined [Hepatitis C test declined] : Hepatitis C test declined [None] : None [With Family] : lives with family [# of Members in Household ___] :  household currently consist of [unfilled] member(s) [Employed] : employed [High School] : high school [] :  [# Of Children ___] : has [unfilled] children [Sexually Active] : sexually active [Feels Safe at Home] : Feels safe at home [Neglect Or Abandonment] : neglect or abandonment [Fully functional (bathing, dressing, toileting, transferring, walking, feeding)] : Fully functional (bathing, dressing, toileting, transferring, walking, feeding) [Fully functional (using the telephone, shopping, preparing meals, housekeeping, doing laundry, using] : Fully functional and needs no help or supervision to perform IADLs (using the telephone, shopping, preparing meals, housekeeping, doing laundry, using transportation, managing medications and managing finances) [Smoke Detector] : smoke detector [Carbon Monoxide Detector] : carbon monoxide detector [Seat Belt] :  uses seat belt [Patient/Caregiver unclear of wishes] : Patient/Caregiver unclear of wishes [] : No [de-identified] : occasional whiskey 1 glass every week [Audit-CScore] : 2 [de-identified] : None [de-identified] : Regular [[id number]] : 0 [Change in mental status noted] : No change in mental status noted [Language] : denies difficulty with language [Behavior] : denies difficulty with behavior [Learning/Retaining New Information] : denies difficulty learning/retaining new information [Handling Complex Tasks] : denies difficulty handling complex tasks [Reasoning] : denies difficulty with reasoning [Spatial Ability and Orientation] : denies difficulty with spatial ability and orientation [High Risk Behavior] : no high risk behavior [Reports changes in hearing] : Reports no changes in hearing [Reports changes in vision] : Reports no changes in vision [Reports normal functional visual acuity (ie: able to read med bottle)] : Reports poor functional visual acuity.  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

## 2021-01-07 NOTE — HISTORY OF PRESENT ILLNESS
[FreeTextEntry8] : This is a 51 y/o male with PMHx significant for chronic low back pain, GERD, HLD, Obesity, Vitamin D insufficiency, presenting c/o continued low abdominal pain. Pt states that he had EGD performed last month (Dr Bianchi -record scanned), no acute findings and continued to have the pain/discomfort until he started using Miralax daily and noticed that when having more complete BMs his discomfort would improve. Pt states that he took Miralax for about a week and then stopped as he wanted to discuss it with us prior to continuing. Pt also states that he is awaiting approval for his bowel regimen to be able to get Colonoscopy done with Dr Bianchi. [FreeTextEntry1] : physical exam [de-identified] : This is a 51 y/o male with PMHx significant for chronic low back pain, GERD, HLD, Obesity, Vitamin D insufficiency, presenting for CPE.

## 2021-01-07 NOTE — ASSESSMENT
[FreeTextEntry1] : This is a 51 y/o male with PMHx significant for chronic low back pain, GERD, HLD, Obesity, Vitamin D insufficiency, presenting for CPE.\par \par : abdominal pain\par -Improved while on Miralax, continue 1 pkt daily prn\par -Abdominal Xray ordered but not performed yet.\par -Dietary changes, no eating after 3 hours prior to bedtime.\par \par Obesity/ Pre-DM:\par - A1c with Estimated Average Glucose 5.9 / 123 in November.\par -Weight loss encouraged\par -Dietary changes discussed again.\par \par [facility]: HLD\par -Lipid panel showed , Total Cholesterol 207, Trig 70, HDL 67.\par -Advised low fat/low cholesterol diet and weight loss, will check fasting lipids today.\par \par F/E/N: Vitamin D insufficiency.\par -Will check Vitamin D today.\par \par HCM:\par -Fasting labs at outside lab as he is not fasting.\par -Covid PCR in house today.\par -EKG 12/12/2017\par -Depression screening: negative.\par -Pt refused Flu vaccine.\par -Tdap: 12//12/2017\par -HIV testing offered: he declined testing 12/12/2017\par -Covid-19 Ab testing Negative.\par

## 2021-01-11 ENCOUNTER — OUTPATIENT (OUTPATIENT)
Dept: OUTPATIENT SERVICES | Facility: HOSPITAL | Age: 51
LOS: 1 days | End: 2021-01-11
Payer: MEDICAID

## 2021-01-11 ENCOUNTER — APPOINTMENT (OUTPATIENT)
Dept: RADIOLOGY | Facility: CLINIC | Age: 51
End: 2021-01-11
Payer: MEDICAID

## 2021-01-11 DIAGNOSIS — K59.00 CONSTIPATION, UNSPECIFIED: ICD-10-CM

## 2021-01-11 DIAGNOSIS — R10.9 UNSPECIFIED ABDOMINAL PAIN: ICD-10-CM

## 2021-01-11 PROCEDURE — 74018 RADEX ABDOMEN 1 VIEW: CPT

## 2021-01-11 PROCEDURE — 74018 RADEX ABDOMEN 1 VIEW: CPT | Mod: 26

## 2021-02-11 ENCOUNTER — APPOINTMENT (OUTPATIENT)
Dept: FAMILY MEDICINE | Facility: CLINIC | Age: 51
End: 2021-02-11
Payer: MEDICAID

## 2021-02-11 VITALS
SYSTOLIC BLOOD PRESSURE: 118 MMHG | HEART RATE: 95 BPM | BODY MASS INDEX: 30.92 KG/M2 | WEIGHT: 197 LBS | DIASTOLIC BLOOD PRESSURE: 70 MMHG | TEMPERATURE: 98 F | RESPIRATION RATE: 16 BRPM | OXYGEN SATURATION: 99 % | HEIGHT: 67 IN

## 2021-02-11 DIAGNOSIS — Z20.822 CONTACT WITH AND (SUSPECTED) EXPOSURE TO COVID-19: ICD-10-CM

## 2021-02-11 PROCEDURE — 99213 OFFICE O/P EST LOW 20 MIN: CPT

## 2021-02-11 PROCEDURE — 99072 ADDL SUPL MATRL&STAF TM PHE: CPT

## 2021-02-11 NOTE — HISTORY OF PRESENT ILLNESS
[de-identified] : This is a 51 y/o male with PMHx significant for chronic low back pain, GERD, HLD, Obesity, Vitamin D insufficiency, presenting c/o continued suprapelvic pain rated 3-4/10, pain comes and goes, worsening when bending over, denies any changes with urinary incontinence, urgency. Abd Xray failed to show any abnormality.

## 2021-02-11 NOTE — ASSESSMENT
[FreeTextEntry1] : This is a 49 y/o male with PMHx significant for chronic low back pain, GERD, HLD, Obesity, Vitamin D insufficiency, presenting for c/o suprapubic pain.\par \par : abdominal pain, constipation\par -Constipation improved while on Miralax, continue 1 pkt daily prn\par -Abdominal Xray failed to show any abnormality.\par -Will request CT Pelvis wo contrast\par -Dietary changes, no eating after 3 hours prior to bedtime.\par \par Obesity/ Pre-DM:\par - A1c with Estimated Average Glucose 5.9 / 123 in November.\par -Weight loss encouraged\par -Dietary changes discussed again.\par -Awaiting results from CPE.\par \par [facility]S: HLD\par -Lipid panel showed , Total Cholesterol 207, Trig 70, HDL 67.\par -Advised low fat/low cholesterol diet and weight loss.\par \par F/E/N: Vitamin D insufficiency.\par -Will check Vitamin D.\par \par HCM:\par -Covid PCR negative.\par -EKG 12/12/2017\par -Depression screening: negative.\par -Pt refused Flu vaccine.\par -Tdap: 12//12/2017\par -HIV testing offered: he declined testing 12/12/2017\par -Covid-19 Ab testing Negative.\par

## 2021-02-11 NOTE — PHYSICAL EXAM
[Rounded] : rounded [Soft, Nontender] : the abdomen was soft and nontender [Normal] : normal to percussion [No Mass] : no masses were palpated

## 2021-02-11 NOTE — HEALTH RISK ASSESSMENT
[Yes] : Yes [2 - 4 times a month (2 pts)] : 2-4 times a month (2 points) [1 or 2 (0 pts)] : 1 or 2 (0 points) [Never (0 pts)] : Never (0 points) [No] : In the past 12 months have you used drugs other than those required for medical reasons? No [No falls in past year] : Patient reported no falls in the past year [0] : 2) Feeling down, depressed, or hopeless: Not at all (0) [] : No [de-identified] : occasional whiskey 1 glass every week [Audit-CScore] : 2 [de-identified] : None [de-identified] : Regular [[id number]] : 0

## 2021-03-02 NOTE — PHYSICAL EXAM
[No Joint Swelling] : no joint swelling [Grossly Normal Strength/Tone] : grossly normal strength/tone [Normal] : no rash [de-identified] : Small roud solid mass  at the medial aspect of the base of the RIGHT fourth digit, mobile, well defined borders. PAIN SCALE 3 OF 10.

## 2021-03-07 ENCOUNTER — APPOINTMENT (OUTPATIENT)
Dept: ULTRASOUND IMAGING | Facility: CLINIC | Age: 51
End: 2021-03-07

## 2021-03-19 ENCOUNTER — RESULT REVIEW (OUTPATIENT)
Age: 51
End: 2021-03-19

## 2021-03-19 ENCOUNTER — OUTPATIENT (OUTPATIENT)
Dept: OUTPATIENT SERVICES | Facility: HOSPITAL | Age: 51
LOS: 1 days | End: 2021-03-19
Payer: MEDICAID

## 2021-03-19 ENCOUNTER — APPOINTMENT (OUTPATIENT)
Dept: ULTRASOUND IMAGING | Facility: CLINIC | Age: 51
End: 2021-03-19
Payer: MEDICAID

## 2021-03-19 DIAGNOSIS — R10.2 PELVIC AND PERINEAL PAIN: ICD-10-CM

## 2021-03-19 PROCEDURE — 76856 US EXAM PELVIC COMPLETE: CPT

## 2021-03-19 PROCEDURE — 76856 US EXAM PELVIC COMPLETE: CPT | Mod: 26

## 2021-06-29 ENCOUNTER — TRANSCRIPTION ENCOUNTER (OUTPATIENT)
Age: 51
End: 2021-06-29

## 2021-08-27 ENCOUNTER — APPOINTMENT (OUTPATIENT)
Dept: FAMILY MEDICINE | Facility: CLINIC | Age: 51
End: 2021-08-27
Payer: MEDICAID

## 2021-08-27 VITALS
TEMPERATURE: 97.9 F | BODY MASS INDEX: 30.76 KG/M2 | SYSTOLIC BLOOD PRESSURE: 118 MMHG | DIASTOLIC BLOOD PRESSURE: 70 MMHG | HEART RATE: 88 BPM | WEIGHT: 196 LBS | HEIGHT: 67 IN | RESPIRATION RATE: 16 BRPM | OXYGEN SATURATION: 98 %

## 2021-08-27 PROCEDURE — 99212 OFFICE O/P EST SF 10 MIN: CPT

## 2021-08-30 NOTE — PHYSICAL EXAM
[Rounded] : rounded [Normal] : normal [Soft, Nontender] : the abdomen was soft and nontender [Christian's] : a negative Christian's sign [Rovsing's] : a negative Rovsing's sign [Obturator] : a negative obturator sign [Psoas] : a negative psoas sign [No Mass] : no masses were palpated [Inguinal Hernia Left] : a left inguinal hernia was present [de-identified] : Bulging mass noticed when pt coughing on the left inguinal area, slight pain on palpation, not incarcerated.

## 2021-08-30 NOTE — HISTORY OF PRESENT ILLNESS
[de-identified] : This is a 51 y/o male with PMHx significant for chronic low back pain, GERD, HLD, Obesity, Vitamin D insufficiency, presenting c/o continued suprapelvic pain rated 3-4/10, pain comes and goes, worsening when bending over, denies any changes with urinary incontinence, urgency. Abd Xray failed to show any abnormality. [FreeTextEntry8] : This is a 49 y/o male with PMHx significant for chronic low back pain, GERD, HLD, Obesity, Vitamin D insufficiency, presenting c/o continued suprapelvic pain now more inguinal pain on the LEFT side. Denies any changes in BM, pain not relieve by NSAIDs, only with rest. pt avoiding heavy lifting secondary to pain.

## 2021-08-30 NOTE — HEALTH RISK ASSESSMENT
[Yes] : Yes [2 - 4 times a month (2 pts)] : 2-4 times a month (2 points) [1 or 2 (0 pts)] : 1 or 2 (0 points) [Never (0 pts)] : Never (0 points) [No] : In the past 12 months have you used drugs other than those required for medical reasons? No [No falls in past year] : Patient reported no falls in the past year [0] : 2) Feeling down, depressed, or hopeless: Not at all (0) [] : No [de-identified] : occasional whiskey 1 glass every week [Audit-CScore] : 2 [de-identified] : None [de-identified] : Regular [[id number]] : 0

## 2021-08-30 NOTE — ASSESSMENT
[FreeTextEntry1] : This is a 51 y/o male with PMHx significant for chronic low back pain, GERD, HLD, Obesity, Vitamin D insufficiency, presenting for c/o LEFT inguinal pain\par \par : Pelvic pain.\par -Abdominal Xray failed to show any abnormality.\par -Pelvic US showed no abnormality\par -CT Pelvis w contrast ordered to r/o LEFT inguinal hernia.\par \par Obesity/ Pre-DM:\par - A1c with Estimated Average Glucose 5.9 / 123 in November.\par -Weight loss encouraged\par -Dietary changes discussed again.\par \par [facility]: HLD\par -Lipid panel showed , Total Cholesterol 207, Trig 70, HDL 67.\par -Advised low fat/low cholesterol diet and weight loss.\par \par F/E/N: Vitamin D insufficiency.\par -Will check Vitamin D.\par \par HCM:\par -EKG 12/12/2017\par -Depression screening: negative.\par -Pt refused Flu vaccine.\par -Tdap: 12//12/2017\par -HIV testing offered: he declined testing 12/12/2017\par -Covid-19 vaccine completed -PFIZER, will bring immunization card on next visit.\par

## 2021-09-22 ENCOUNTER — APPOINTMENT (OUTPATIENT)
Dept: CT IMAGING | Facility: CLINIC | Age: 51
End: 2021-09-22
Payer: MEDICAID

## 2021-09-22 ENCOUNTER — RESULT REVIEW (OUTPATIENT)
Age: 51
End: 2021-09-22

## 2021-09-22 ENCOUNTER — OUTPATIENT (OUTPATIENT)
Dept: OUTPATIENT SERVICES | Facility: HOSPITAL | Age: 51
LOS: 1 days | End: 2021-09-22

## 2021-09-22 DIAGNOSIS — R10.2 PELVIC AND PERINEAL PAIN: ICD-10-CM

## 2021-09-22 PROCEDURE — 72193 CT PELVIS W/DYE: CPT | Mod: 26

## 2022-02-03 ENCOUNTER — APPOINTMENT (OUTPATIENT)
Dept: SURGERY | Facility: CLINIC | Age: 52
End: 2022-02-03
Payer: MEDICAID

## 2022-02-03 VITALS
SYSTOLIC BLOOD PRESSURE: 115 MMHG | HEIGHT: 66.5 IN | DIASTOLIC BLOOD PRESSURE: 74 MMHG | RESPIRATION RATE: 16 BRPM | OXYGEN SATURATION: 95 % | TEMPERATURE: 97.3 F | HEART RATE: 71 BPM | BODY MASS INDEX: 31.13 KG/M2 | WEIGHT: 196 LBS

## 2022-02-03 PROCEDURE — 99204 OFFICE O/P NEW MOD 45 MIN: CPT

## 2022-02-03 NOTE — HISTORY OF PRESENT ILLNESS
[de-identified] : Mr. RODRIGUEZ is a 51 year old man with 7 month history of left groin pain, referred by Bernardo Zamora for evaluation. Reports intermittent pain, especially at end of day and with activity. No incarceration. Denies fever/chills/nausea/emesis or changes in bowel or bladder habits. Tolerating diet. Normal bowel movements.\par \par denies smoking\par \par Reviewed CTAP

## 2022-02-03 NOTE — CONSULT LETTER
[Dear  ___] : Dear ~SANGEETA, [Consult Letter:] : I had the pleasure of evaluating your patient, [unfilled]. [Please see my note below.] : Please see my note below. [Consult Closing:] : Thank you very much for allowing me to participate in the care of this patient.  If you have any questions, please do not hesitate to contact me. [Sincerely,] : Sincerely, [FreeTextEntry3] : Roosevelt Chatman MD\par General, Laparoscopic, and Bariatric Surgery\par [facility]

## 2022-02-03 NOTE — PHYSICAL EXAM
[No Rash or Lesion] : No rash or lesion [Alert] : alert [Oriented to Person] : oriented to person [Oriented to Place] : oriented to place [Oriented to Time] : oriented to time [Calm] : calm [JVD] : no jugular venous distention  [de-identified] : No acute distress [de-identified] : No respiratory distress [de-identified] : Regular rate [de-identified] : soft, nontender. no rebound or guarding. palpable left inguinal hernia - nontender, reducible. no palpable right sided defect [de-identified] : normal range of motion

## 2022-02-03 NOTE — ASSESSMENT
[FreeTextEntry1] : Mr. RODRIGUEZ is a 51 year old man with symptomatic left inguinal hernia. We discussed the options of robotic/laparoscopic repair, open repair, and nonoperative management. The risks/benefits and alternatives were discussed at length and all questions were answered. We discussed the risks and benefits of the use of mesh. The patient appears to understand and wishes to proceed with robotic left inguinal hernia repair with mesh.

## 2022-02-04 ENCOUNTER — OUTPATIENT (OUTPATIENT)
Dept: OUTPATIENT SERVICES | Facility: HOSPITAL | Age: 52
LOS: 1 days | End: 2022-02-04
Payer: COMMERCIAL

## 2022-02-04 VITALS
HEIGHT: 69 IN | HEART RATE: 76 BPM | SYSTOLIC BLOOD PRESSURE: 120 MMHG | DIASTOLIC BLOOD PRESSURE: 80 MMHG | WEIGHT: 194.89 LBS | RESPIRATION RATE: 18 BRPM | TEMPERATURE: 98 F | OXYGEN SATURATION: 97 %

## 2022-02-04 DIAGNOSIS — Z98.890 OTHER SPECIFIED POSTPROCEDURAL STATES: Chronic | ICD-10-CM

## 2022-02-04 DIAGNOSIS — R10.9 UNSPECIFIED ABDOMINAL PAIN: ICD-10-CM

## 2022-02-04 DIAGNOSIS — K40.90 UNILATERAL INGUINAL HERNIA, WITHOUT OBSTRUCTION OR GANGRENE, NOT SPECIFIED AS RECURRENT: ICD-10-CM

## 2022-02-04 DIAGNOSIS — Z01.818 ENCOUNTER FOR OTHER PREPROCEDURAL EXAMINATION: ICD-10-CM

## 2022-02-04 DIAGNOSIS — Z29.9 ENCOUNTER FOR PROPHYLACTIC MEASURES, UNSPECIFIED: ICD-10-CM

## 2022-02-04 LAB
A1C WITH ESTIMATED AVERAGE GLUCOSE RESULT: 5.9 % — HIGH (ref 4–5.6)
ANION GAP SERPL CALC-SCNC: 9 MMOL/L — SIGNIFICANT CHANGE UP (ref 5–17)
APPEARANCE UR: CLEAR — SIGNIFICANT CHANGE UP
APTT BLD: 31.4 SEC — SIGNIFICANT CHANGE UP (ref 27.5–35.5)
BACTERIA # UR AUTO: ABNORMAL
BASOPHILS # BLD AUTO: 0.02 K/UL — SIGNIFICANT CHANGE UP (ref 0–0.2)
BASOPHILS NFR BLD AUTO: 0.4 % — SIGNIFICANT CHANGE UP (ref 0–2)
BILIRUB UR-MCNC: NEGATIVE — SIGNIFICANT CHANGE UP
BUN SERPL-MCNC: 12.1 MG/DL — SIGNIFICANT CHANGE UP (ref 8–20)
CALCIUM SERPL-MCNC: 9.1 MG/DL — SIGNIFICANT CHANGE UP (ref 8.6–10.2)
CHLORIDE SERPL-SCNC: 105 MMOL/L — SIGNIFICANT CHANGE UP (ref 98–107)
CO2 SERPL-SCNC: 26 MMOL/L — SIGNIFICANT CHANGE UP (ref 22–29)
COLOR SPEC: YELLOW — SIGNIFICANT CHANGE UP
CREAT SERPL-MCNC: 0.97 MG/DL — SIGNIFICANT CHANGE UP (ref 0.5–1.3)
DIFF PNL FLD: NEGATIVE — SIGNIFICANT CHANGE UP
EOSINOPHIL # BLD AUTO: 0.05 K/UL — SIGNIFICANT CHANGE UP (ref 0–0.5)
EOSINOPHIL NFR BLD AUTO: 1 % — SIGNIFICANT CHANGE UP (ref 0–6)
EPI CELLS # UR: SIGNIFICANT CHANGE UP
ESTIMATED AVERAGE GLUCOSE: 123 MG/DL — HIGH (ref 68–114)
GLUCOSE SERPL-MCNC: 99 MG/DL — SIGNIFICANT CHANGE UP (ref 70–99)
GLUCOSE UR QL: NEGATIVE MG/DL — SIGNIFICANT CHANGE UP
HCT VFR BLD CALC: 43.7 % — SIGNIFICANT CHANGE UP (ref 39–50)
HGB BLD-MCNC: 14.6 G/DL — SIGNIFICANT CHANGE UP (ref 13–17)
IMM GRANULOCYTES NFR BLD AUTO: 0.2 % — SIGNIFICANT CHANGE UP (ref 0–1.5)
INR BLD: 1.06 RATIO — SIGNIFICANT CHANGE UP (ref 0.88–1.16)
KETONES UR-MCNC: NEGATIVE — SIGNIFICANT CHANGE UP
LEUKOCYTE ESTERASE UR-ACNC: NEGATIVE — SIGNIFICANT CHANGE UP
LYMPHOCYTES # BLD AUTO: 1.46 K/UL — SIGNIFICANT CHANGE UP (ref 1–3.3)
LYMPHOCYTES # BLD AUTO: 28.4 % — SIGNIFICANT CHANGE UP (ref 13–44)
MCHC RBC-ENTMCNC: 29.8 PG — SIGNIFICANT CHANGE UP (ref 27–34)
MCHC RBC-ENTMCNC: 33.4 GM/DL — SIGNIFICANT CHANGE UP (ref 32–36)
MCV RBC AUTO: 89.2 FL — SIGNIFICANT CHANGE UP (ref 80–100)
MONOCYTES # BLD AUTO: 0.47 K/UL — SIGNIFICANT CHANGE UP (ref 0–0.9)
MONOCYTES NFR BLD AUTO: 9.1 % — SIGNIFICANT CHANGE UP (ref 2–14)
MRSA PCR RESULT.: SIGNIFICANT CHANGE UP
NEUTROPHILS # BLD AUTO: 3.13 K/UL — SIGNIFICANT CHANGE UP (ref 1.8–7.4)
NEUTROPHILS NFR BLD AUTO: 60.9 % — SIGNIFICANT CHANGE UP (ref 43–77)
NITRITE UR-MCNC: NEGATIVE — SIGNIFICANT CHANGE UP
PH UR: 7 — SIGNIFICANT CHANGE UP (ref 5–8)
PLATELET # BLD AUTO: 150 K/UL — SIGNIFICANT CHANGE UP (ref 150–400)
POTASSIUM SERPL-MCNC: 4.6 MMOL/L — SIGNIFICANT CHANGE UP (ref 3.5–5.3)
POTASSIUM SERPL-SCNC: 4.6 MMOL/L — SIGNIFICANT CHANGE UP (ref 3.5–5.3)
PROT UR-MCNC: 15
PROTHROM AB SERPL-ACNC: 12.3 SEC — SIGNIFICANT CHANGE UP (ref 10.6–13.6)
RBC # BLD: 4.9 M/UL — SIGNIFICANT CHANGE UP (ref 4.2–5.8)
RBC # FLD: 12.9 % — SIGNIFICANT CHANGE UP (ref 10.3–14.5)
S AUREUS DNA NOSE QL NAA+PROBE: SIGNIFICANT CHANGE UP
SODIUM SERPL-SCNC: 140 MMOL/L — SIGNIFICANT CHANGE UP (ref 135–145)
SP GR SPEC: 1.01 — SIGNIFICANT CHANGE UP (ref 1.01–1.02)
UROBILINOGEN FLD QL: NEGATIVE MG/DL — SIGNIFICANT CHANGE UP
WBC # BLD: 5.14 K/UL — SIGNIFICANT CHANGE UP (ref 3.8–10.5)
WBC # FLD AUTO: 5.14 K/UL — SIGNIFICANT CHANGE UP (ref 3.8–10.5)

## 2022-02-04 PROCEDURE — 93005 ELECTROCARDIOGRAM TRACING: CPT

## 2022-02-04 PROCEDURE — G0463: CPT

## 2022-02-04 PROCEDURE — 93010 ELECTROCARDIOGRAM REPORT: CPT

## 2022-02-04 RX ORDER — CEFAZOLIN SODIUM 1 G
2000 VIAL (EA) INJECTION ONCE
Refills: 0 | Status: COMPLETED | OUTPATIENT
Start: 2022-02-15 | End: 2022-02-15

## 2022-02-04 NOTE — H&P PST ADULT - ASSESSMENT
CAPRINI SCORE    AGE RELATED RISK FACTORS                                                             [ x] Age 41-60 years                                            (1 Point)  [ ] Age: 61-74 years                                           (2 Points)                 [ ] Age= 75 years                                                (3 Points)             DISEASE RELATED RISK FACTORS                                                       [ ] Edema in the lower extremities                 (1 Point)                     [ ] Varicose veins                                               (1 Point)                                 [x ] BMI > 25 Kg/m2                                            (1 Point)                                  [ ] Serious infection (ie PNA)                            (1 Point)                     [ ] Lung disease ( COPD, Emphysema)            (1 Point)                                                                          [ ] Acute myocardial infarction                         (1 Point)                  [ ] Congestive heart failure (in the previous month)  (1 Point)         [ ] Inflammatory bowel disease                            (1 Point)                  [ ] Central venous access, PICC or Port               (2 points)       (within the last month)                                                                [ ] Stroke (in the previous month)                        (5 Points)    [ ] Previous or present malignancy                       (2 points)                                                                                                                                                         HEMATOLOGY RELATED FACTORS                                                         [ ] Prior episodes of VTE                                     (3 Points)                     [ ] Positive family history for VTE                      (3 Points)                  [ ] Prothrombin 76000 A                                     (3 Points)                     [ ] Factor V Leiden                                                (3 Points)                        [ ] Lupus anticoagulants                                      (3 Points)                                                           [ ] Anticardiolipin antibodies                              (3 Points)                                                       [ ] High homocysteine in the blood                   (3 Points)                                             [ ] Other congenital or acquired thrombophilia      (3 Points)                                                [ ] Heparin induced thrombocytopenia                  (3 Points)                                        MOBILITY RELATED FACTORS  [ ] Bed rest                                                         (1 Point)  [ ] Plaster cast                                                    (2 points)  [ ] Bed bound for more than 72 hours           (2 Points)    GENDER SPECIFIC FACTORS  [ ] Pregnancy or had a baby within the last month   (1 Point)  [ ] Post-partum < 6 weeks                                   (1 Point)  [ ] Hormonal therapy  or oral contraception   (1 Point)  [ ] History of pregnancy complications              (1 point)  [ ] Unexplained or recurrent              (1 Point)    OTHER RISK FACTORS                                           (1 Point)  [ ] BMI >40, smoking, diabetes requiring insulin, chemotherapy  blood transfusions and length of surgery over 2 hours    SURGERY RELATED RISK FACTORS  [ ]  Section within the last month     (1 Point)  [ ] Minor surgery                                                  (1 Point)  [ ] Arthroscopic surgery                                       (2 Points)  [ x] Planned major surgery lasting more            (2 Points)      than 45 minutes     [ ] Elective hip or knee joint replacement       (5 points)       surgery                                                TRAUMA RELATED RISK FACTORS  [ ] Fracture of the hip, pelvis, or leg                       (5 Points)  [ ] Spinal cord injury resulting in paralysis             (5 points)       (in the previous month)    [ ] Paralysis  (less than 1 month)                             (5 Points)  [ ] Multiple Trauma within 1 month                        (5 Points)    Total Score [     4 ]    Caprini Score 0-2: Low Risk, NO VTE prophylaxis required for most patients, encourage ambulation  Caprini Score 3-6: Moderate Risk , pharmacologic VTE prophylaxis is indicated for most patients (in the absence of contraindications)  Caprini Score Greater than or =7: High risk, pharmocologic VTE prophylaxis indicated for most patients (in the absence of contraindications)        OPIOID RISK TOOL     EACH BOX THAT APPLIES AND ADD TOTALS AT THE END    FAMILY HISTORY OF SUBSTANCE ABUSE                 FEMALE         MALE                                                Alcohol                             [  ]1 pt          [  ]3pts                                               Illegal Durgs                     [  ]2 pts        [  ]3pts                                               Rx Drugs                           [  ]4 pts        [  ]4 pts    PERSONAL HISTORY OF SUBSTANCE ABUSE                                                                                          Alcohol                             [  ]3 pts       [  ]3 pts                                               Illegal Durgs                     [  ]4 pts        [  ]4 pts                                               Rx Drugs                           [  ]5 pts        [  ]5 pts    AGE BETWEEN 16-45 YEARS                                      [  ]1 pt         [  ]1 pt    HISTORY OF PREADOLESCENT   SEXUAL ABUSE                                                             [  ]3 pts        [  ]0pts    PSYCHOLOGICAL DISEASE                     ADD, OCD, Bipolar, Schizophrenia        [  ]2 pts         [  ]2 pts                      Depression                                               [  ]1 pt           [  ]1 pt           SCORING TOTAL   0  A score of 3 or lower indicated LOW risk for future opiod abuse  A score of 4 to 7 indicated moderate risk for future opiod abuse  A score of 8 or higher indicates a high risk for opiod abuse    51 year old male with past medical history of GERD diagnosed on endoscopy, presents to PST today with complaints of left inguinal hernia, states he first noticed hernia 6-7 months ago, describes pain as sharp, states the pain got increasingly worse, 7/10 in severity, exacerbated by heavy lifting, relief with rest. He reports constipation at times, taking miralax. Patient is scheduled for robotic left inguinal hernia repair with mesh with Dr Chatman on 2/15/22.  Patient educated on surgical scrub, COVID testing, preadmission instructions, medical clearance and day of procedure medications, verbalizes understanding. Pt instructed to stop vitamins/supplements/herbal medications/ASA/NSAIDS for one week prior to surgery and discuss with PMD.  CAPRINI SCORE    AGE RELATED RISK FACTORS                                                             [ x] Age 41-60 years                                            (1 Point)  [ ] Age: 61-74 years                                           (2 Points)                 [ ] Age= 75 years                                                (3 Points)             DISEASE RELATED RISK FACTORS                                                       [ ] Edema in the lower extremities                 (1 Point)                     [ ] Varicose veins                                               (1 Point)                                 [x ] BMI > 25 Kg/m2                                            (1 Point)                                  [ ] Serious infection (ie PNA)                            (1 Point)                     [ ] Lung disease ( COPD, Emphysema)            (1 Point)                                                                          [ ] Acute myocardial infarction                         (1 Point)                  [ ] Congestive heart failure (in the previous month)  (1 Point)         [ ] Inflammatory bowel disease                            (1 Point)                  [ ] Central venous access, PICC or Port               (2 points)       (within the last month)                                                                [ ] Stroke (in the previous month)                        (5 Points)    [ ] Previous or present malignancy                       (2 points)                                                                                                                                                         HEMATOLOGY RELATED FACTORS                                                         [ ] Prior episodes of VTE                                     (3 Points)                     [ ] Positive family history for VTE                      (3 Points)                  [ ] Prothrombin 97522 A                                     (3 Points)                     [ ] Factor V Leiden                                                (3 Points)                        [ ] Lupus anticoagulants                                      (3 Points)                                                           [ ] Anticardiolipin antibodies                              (3 Points)                                                       [ ] High homocysteine in the blood                   (3 Points)                                             [ ] Other congenital or acquired thrombophilia      (3 Points)                                                [ ] Heparin induced thrombocytopenia                  (3 Points)                                        MOBILITY RELATED FACTORS  [ ] Bed rest                                                         (1 Point)  [ ] Plaster cast                                                    (2 points)  [ ] Bed bound for more than 72 hours           (2 Points)    GENDER SPECIFIC FACTORS  [ ] Pregnancy or had a baby within the last month   (1 Point)  [ ] Post-partum < 6 weeks                                   (1 Point)  [ ] Hormonal therapy  or oral contraception   (1 Point)  [ ] History of pregnancy complications              (1 point)  [ ] Unexplained or recurrent              (1 Point)    OTHER RISK FACTORS                                           (1 Point)  [ ] BMI >40, smoking, diabetes requiring insulin, chemotherapy  blood transfusions and length of surgery over 2 hours    SURGERY RELATED RISK FACTORS  [ ]  Section within the last month     (1 Point)  [ ] Minor surgery                                                  (1 Point)  [ ] Arthroscopic surgery                                       (2 Points)  [ x] Planned major surgery lasting more            (2 Points)      than 45 minutes     [ ] Elective hip or knee joint replacement       (5 points)       surgery                                                TRAUMA RELATED RISK FACTORS  [ ] Fracture of the hip, pelvis, or leg                       (5 Points)  [ ] Spinal cord injury resulting in paralysis             (5 points)       (in the previous month)    [ ] Paralysis  (less than 1 month)                             (5 Points)  [ ] Multiple Trauma within 1 month                        (5 Points)    Total Score [     4 ]    Caprini Score 0-2: Low Risk, NO VTE prophylaxis required for most patients, encourage ambulation  Caprini Score 3-6: Moderate Risk , pharmacologic VTE prophylaxis is indicated for most patients (in the absence of contraindications)  Caprini Score Greater than or =7: High risk, pharmocologic VTE prophylaxis indicated for most patients (in the absence of contraindications)        OPIOID RISK TOOL     EACH BOX THAT APPLIES AND ADD TOTALS AT THE END    FAMILY HISTORY OF SUBSTANCE ABUSE                 FEMALE         MALE                                                Alcohol                             [  ]1 pt          [  ]3pts                                               Illegal Durgs                     [  ]2 pts        [  ]3pts                                               Rx Drugs                           [  ]4 pts        [  ]4 pts    PERSONAL HISTORY OF SUBSTANCE ABUSE                                                                                          Alcohol                             [  ]3 pts       [  ]3 pts                                               Illegal Durgs                     [  ]4 pts        [  ]4 pts                                               Rx Drugs                           [  ]5 pts        [  ]5 pts    AGE BETWEEN 16-45 YEARS                                      [  ]1 pt         [  ]1 pt    HISTORY OF PREADOLESCENT   SEXUAL ABUSE                                                             [  ]3 pts        [  ]0pts    PSYCHOLOGICAL DISEASE                     ADD, OCD, Bipolar, Schizophrenia        [  ]2 pts         [  ]2 pts                      Depression                                               [  ]1 pt           [  ]1 pt           SCORING TOTAL   0  A score of 3 or lower indicated LOW risk for future opiod abuse  A score of 4 to 7 indicated moderate risk for future opiod abuse  A score of 8 or higher indicates a high risk for opiod abuse    51 year old male with past medical history of GERD diagnosed on endoscopy, presents to PST today with complaints of left inguinal hernia, states he first noticed hernia 6-7 months ago, describes pain as sharp, states the pain got increasingly worse, 7/10 in severity, exacerbated by heavy lifting, relief with rest. He reports constipation at times, taking miralax. tenderness to suprapubic area and left groin, + bowel sounds, no distention or organomegaly.  Patient is scheduled for robotic left inguinal hernia repair with mesh with Dr Chatman on 2/15/22.  Patient educated on surgical scrub, COVID testing , preadmission instructions, medical clearance and day of procedure medications, verbalizes understanding. Pt instructed to stop vitamins/supplements/herbal medications/ASA/NSAIDS for one week prior to surgery and discuss with PMD.

## 2022-02-04 NOTE — H&P PST ADULT - NSICDXPASTMEDICALHX_GEN_ALL_CORE_FT
PAST MEDICAL HISTORY:  Abdominal pain     Unilateral inguinal hernia without obstruction or gangrene      PAST MEDICAL HISTORY:  2019 novel coronavirus disease (COVID-19)     Abdominal pain     Unilateral inguinal hernia without obstruction or gangrene

## 2022-02-04 NOTE — H&P PST ADULT - NEGATIVE GENERAL GENITOURINARY SYMPTOMS
no hematuria/no urinary hesitancy/normal urinary frequency no hematuria/no flank pain L/no flank pain R/no urinary hesitancy/normal urinary frequency

## 2022-02-04 NOTE — H&P PST ADULT - ATTENDING COMMENTS
Plan for robotic left inguinal hernia repair with mesh, possible open   Risks/benefits discussed with patient. He understands, agrees, and wishes to proceed. All questions answered. Plan for robotic left inguinal hernia repair with mesh, possible bilateral repair, possible open, and any other indicated procedures  Risks/benefits discussed with patient. Pt states that if a right sided hernia is encountered, he would want it repaired. He understands, agrees, and wishes to proceed. All questions answered.

## 2022-02-04 NOTE — H&P PST ADULT - NSICDXFAMILYHX_GEN_ALL_CORE_FT
FAMILY HISTORY:  Mother  Still living? Unknown  FH: breast cancer, Age at diagnosis: Age Unknown    Sibling  Still living? Unknown  FH: stomach cancer, Age at diagnosis: Age Unknown

## 2022-02-04 NOTE — H&P PST ADULT - HISTORY OF PRESENT ILLNESS
51 year old male with past medical history of GERD diagnosed on endoscopy, presents to [facility] today with complaints of left inguinal hernia, states he first noticed hernia 6-7 months ago, describes pain as sharp, states the pain got increasingly worse, 7/10 in severity, exacerbated by heavy lifting, relief with rest. He reports constipation at times, taking miralax. Patient is scheduled for robotic left inguinal hernia repair with mesh with Dr Chatman on 2/15/22. 51 year old male with past medical history of GERD diagnosed on endoscopy, presents to PST today with complaints of left inguinal hernia, states he first noticed hernia 6-7 months ago, describes pain as sharp, states the pain got increasingly worse, 7/10 in severity, exacerbated by heavy lifting, relief with rest.  He reports constipation at times, taking miralax. Patient is scheduled for robotic left inguinal hernia repair with mesh with Dr Chatman on 2/15/22.  Medical clearance is pending as per surgeon request

## 2022-02-05 LAB
CULTURE RESULTS: NO GROWTH — SIGNIFICANT CHANGE UP
SPECIMEN SOURCE: SIGNIFICANT CHANGE UP

## 2022-02-07 PROBLEM — U07.1 COVID-19: Chronic | Status: ACTIVE | Noted: 2022-02-04

## 2022-02-07 PROBLEM — K40.90 UNILATERAL INGUINAL HERNIA, WITHOUT OBSTRUCTION OR GANGRENE, NOT SPECIFIED AS RECURRENT: Chronic | Status: ACTIVE | Noted: 2022-02-04

## 2022-02-07 PROBLEM — R10.9 UNSPECIFIED ABDOMINAL PAIN: Chronic | Status: ACTIVE | Noted: 2022-02-04

## 2022-02-10 ENCOUNTER — APPOINTMENT (OUTPATIENT)
Dept: FAMILY MEDICINE | Facility: CLINIC | Age: 52
End: 2022-02-10
Payer: MEDICAID

## 2022-02-10 VITALS
BODY MASS INDEX: 31.82 KG/M2 | WEIGHT: 198 LBS | RESPIRATION RATE: 16 BRPM | DIASTOLIC BLOOD PRESSURE: 80 MMHG | HEART RATE: 89 BPM | HEIGHT: 66 IN | OXYGEN SATURATION: 97 % | SYSTOLIC BLOOD PRESSURE: 130 MMHG | TEMPERATURE: 98.1 F

## 2022-02-10 DIAGNOSIS — K40.90 UNILATERAL INGUINAL HERNIA, W/OUT OBSTRUCTION OR GANGRENE, NOT SPECIFIED AS RECURRENT: ICD-10-CM

## 2022-02-10 DIAGNOSIS — Z80.0 FAMILY HISTORY OF MALIGNANT NEOPLASM OF DIGESTIVE ORGANS: ICD-10-CM

## 2022-02-10 PROCEDURE — 99213 OFFICE O/P EST LOW 20 MIN: CPT

## 2022-02-10 NOTE — HISTORY OF PRESENT ILLNESS
[No Pertinent Cardiac History] : no history of aortic stenosis, atrial fibrillation, coronary artery disease, recent myocardial infarction, or implantable device/pacemaker [No Pertinent Pulmonary History] : no history of asthma, COPD, sleep apnea, or smoking [No Adverse Anesthesia Reaction] : no adverse anesthesia reaction in self or family member [(Patient denies any chest pain, claudication, dyspnea on exertion, orthopnea, palpitations or syncope)] : Patient denies any chest pain, claudication, dyspnea on exertion, orthopnea, palpitations or syncope [Chronic Anticoagulation] : no chronic anticoagulation [Chronic Kidney Disease] : no chronic kidney disease [Diabetes] : no diabetes [FreeTextEntry1] : ROBOTIC LEFT INGUINAL HERNIA REPAIR WITH MESH [FreeTextEntry2] : 2/15/22 [FreeTextEntry3] : Dr Roosevelt Chatman M.D.

## 2022-02-10 NOTE — PLAN
[FreeTextEntry1] : \par \par \par Medical clearance discussed with and reviewed by supervising attending Dr Katia Beaver M.D.\par

## 2022-02-10 NOTE — PHYSICAL EXAM
[No Acute Distress] : no acute distress [Well Nourished] : well nourished [Well Developed] : well developed [Well-Appearing] : well-appearing [Normal Sclera/Conjunctiva] : normal sclera/conjunctiva [PERRL] : pupils equal round and reactive to light [EOMI] : extraocular movements intact [Normal Outer Ear/Nose] : the outer ears and nose were normal in appearance [Normal Oropharynx] : the oropharynx was normal [No JVD] : no jugular venous distention [No Lymphadenopathy] : no lymphadenopathy [Supple] : supple [Thyroid Normal, No Nodules] : the thyroid was normal and there were no nodules present [No Respiratory Distress] : no respiratory distress  [No Accessory Muscle Use] : no accessory muscle use [Clear to Auscultation] : lungs were clear to auscultation bilaterally [Normal Rate] : normal rate  [Regular Rhythm] : with a regular rhythm [Normal S1, S2] : normal S1 and S2 [No Murmur] : no murmur heard [No Carotid Bruits] : no carotid bruits [No Abdominal Bruit] : a ~M bruit was not heard ~T in the abdomen [No Varicosities] : no varicosities [Pedal Pulses Present] : the pedal pulses are present [No Edema] : there was no peripheral edema [No Palpable Aorta] : no palpable aorta [No Extremity Clubbing/Cyanosis] : no extremity clubbing/cyanosis [Soft] : abdomen soft [Non Tender] : non-tender [Non-distended] : non-distended [No Masses] : no abdominal mass palpated [No HSM] : no HSM [Normal Bowel Sounds] : normal bowel sounds [Normal Posterior Cervical Nodes] : no posterior cervical lymphadenopathy [Normal Anterior Cervical Nodes] : no anterior cervical lymphadenopathy [No CVA Tenderness] : no CVA  tenderness [No Spinal Tenderness] : no spinal tenderness [No Joint Swelling] : no joint swelling [Grossly Normal Strength/Tone] : grossly normal strength/tone [No Rash] : no rash [Coordination Grossly Intact] : coordination grossly intact [No Focal Deficits] : no focal deficits [Normal Gait] : normal gait [Deep Tendon Reflexes (DTR)] : deep tendon reflexes were 2+ and symmetric [Normal Affect] : the affect was normal [Normal Insight/Judgement] : insight and judgment were intact [de-identified] : LEFT inguinal hernia reducible.

## 2022-02-10 NOTE — RESULTS/DATA
[] : results reviewed [de-identified] : All normal [de-identified] : Normal [de-identified] : Normal [de-identified] : NSR.

## 2022-02-10 NOTE — ASSESSMENT
[High Risk Surgery - Intraperitoneal, Intrathoracic or Supringuinal Vascular Procedures] : High Risk Surgery - Intraperitoneal, Intrathoracic or Supringuinal Vascular Procedures - No (0) [Ischemic Heart Disease] : Ischemic Heart Disease - No (0) [Congestive Heart Failure] : Congestive Heart Failure - No (0) [Prior Cerebrovascular Accident or TIA] : Prior Cerebrovascular Accident or TIA - No (0) [Creatinine >= 2mg/dL (1 Point)] : Creatinine >= 2mg/dL - No (0) [Insulin-dependent Diabetic (1 Point)] : Insulin-dependent Diabetic - No (0) [0] : 0 , RCRI Class: I, Risk of Post-Op Cardiac Complications: 3.9%, 95% CI for Risk Estimate: 2.8% - 5.4% [No Further Testing Recommended] : no further testing recommended [As per surgery] : as per surgery [Patient Optimized for Surgery] : Patient optimized for surgery [FreeTextEntry4] : Laboratory results reviewed, no abnormalities found. EKG reviewed, no abnormalities found. Pt is medically cleared for proposed procedure.\par  [FreeTextEntry7] :  Avoid NSAIDs starting 1 week prior to procedure

## 2022-02-14 ENCOUNTER — TRANSCRIPTION ENCOUNTER (OUTPATIENT)
Age: 52
End: 2022-02-14

## 2022-02-15 ENCOUNTER — APPOINTMENT (OUTPATIENT)
Dept: SURGERY | Facility: HOSPITAL | Age: 52
End: 2022-02-15

## 2022-02-15 ENCOUNTER — OUTPATIENT (OUTPATIENT)
Dept: INPATIENT UNIT | Facility: HOSPITAL | Age: 52
LOS: 1 days | End: 2022-02-15
Payer: COMMERCIAL

## 2022-02-15 ENCOUNTER — RESULT REVIEW (OUTPATIENT)
Age: 52
End: 2022-02-15

## 2022-02-15 VITALS
TEMPERATURE: 98 F | DIASTOLIC BLOOD PRESSURE: 70 MMHG | WEIGHT: 194.01 LBS | SYSTOLIC BLOOD PRESSURE: 140 MMHG | HEART RATE: 78 BPM | HEIGHT: 69 IN | RESPIRATION RATE: 15 BRPM | OXYGEN SATURATION: 100 %

## 2022-02-15 VITALS
OXYGEN SATURATION: 99 % | RESPIRATION RATE: 16 BRPM | HEART RATE: 80 BPM | DIASTOLIC BLOOD PRESSURE: 90 MMHG | SYSTOLIC BLOOD PRESSURE: 144 MMHG | TEMPERATURE: 97 F

## 2022-02-15 DIAGNOSIS — Z98.890 OTHER SPECIFIED POSTPROCEDURAL STATES: Chronic | ICD-10-CM

## 2022-02-15 DIAGNOSIS — K40.90 UNILATERAL INGUINAL HERNIA, WITHOUT OBSTRUCTION OR GANGRENE, NOT SPECIFIED AS RECURRENT: ICD-10-CM

## 2022-02-15 PROCEDURE — 88304 TISSUE EXAM BY PATHOLOGIST: CPT | Mod: 26

## 2022-02-15 PROCEDURE — 88304 TISSUE EXAM BY PATHOLOGIST: CPT

## 2022-02-15 PROCEDURE — C1781: CPT

## 2022-02-15 PROCEDURE — 49650 LAP ING HERNIA REPAIR INIT: CPT

## 2022-02-15 PROCEDURE — 49650 LAP ING HERNIA REPAIR INIT: CPT | Mod: LT

## 2022-02-15 PROCEDURE — S2900: CPT

## 2022-02-15 PROCEDURE — 49650 LAP ING HERNIA REPAIR INIT: CPT | Mod: AS

## 2022-02-15 PROCEDURE — S2900 ROBOTIC SURGICAL SYSTEM: CPT

## 2022-02-15 DEVICE — MESH LP PRGRP ANTMCL LT 10X15CM: Type: IMPLANTABLE DEVICE | Status: FUNCTIONAL

## 2022-02-15 RX ORDER — POLYETHYLENE GLYCOL 3350 17 G/17G
0 POWDER, FOR SOLUTION ORAL
Qty: 0 | Refills: 0 | DISCHARGE

## 2022-02-15 RX ORDER — CELECOXIB 200 MG/1
400 CAPSULE ORAL ONCE
Refills: 0 | Status: COMPLETED | OUTPATIENT
Start: 2022-02-15 | End: 2022-02-15

## 2022-02-15 RX ORDER — ACETAMINOPHEN 500 MG
975 TABLET ORAL ONCE
Refills: 0 | Status: COMPLETED | OUTPATIENT
Start: 2022-02-15 | End: 2022-02-15

## 2022-02-15 RX ORDER — SODIUM CHLORIDE 9 MG/ML
1000 INJECTION, SOLUTION INTRAVENOUS
Refills: 0 | Status: DISCONTINUED | OUTPATIENT
Start: 2022-02-15 | End: 2022-02-15

## 2022-02-15 RX ORDER — HYDROMORPHONE HYDROCHLORIDE 2 MG/ML
0.5 INJECTION INTRAMUSCULAR; INTRAVENOUS; SUBCUTANEOUS
Refills: 0 | Status: DISCONTINUED | OUTPATIENT
Start: 2022-02-15 | End: 2022-02-15

## 2022-02-15 RX ORDER — ONDANSETRON 8 MG/1
4 TABLET, FILM COATED ORAL ONCE
Refills: 0 | Status: DISCONTINUED | OUTPATIENT
Start: 2022-02-15 | End: 2022-02-15

## 2022-02-15 RX ORDER — SODIUM CHLORIDE 9 MG/ML
3 INJECTION INTRAMUSCULAR; INTRAVENOUS; SUBCUTANEOUS ONCE
Refills: 0 | Status: DISCONTINUED | OUTPATIENT
Start: 2022-02-15 | End: 2022-02-15

## 2022-02-15 RX ORDER — BUPIVACAINE 13.3 MG/ML
20 INJECTION, SUSPENSION, LIPOSOMAL INFILTRATION ONCE
Refills: 0 | Status: DISCONTINUED | OUTPATIENT
Start: 2022-02-15 | End: 2022-02-15

## 2022-02-15 RX ADMIN — CELECOXIB 400 MILLIGRAM(S): 200 CAPSULE ORAL at 10:29

## 2022-02-15 RX ADMIN — Medication 975 MILLIGRAM(S): at 10:28

## 2022-02-15 RX ADMIN — Medication 100 MILLIGRAM(S): at 11:00

## 2022-02-15 NOTE — ASU DISCHARGE PLAN (ADULT/PEDIATRIC) - NS MD DC FALL RISK RISK
For information on Fall & Injury Prevention, visit: [email or web address] OR  [email or web address] OR  https://www.cdc.gov/steadi/patient.html

## 2022-02-15 NOTE — BRIEF OPERATIVE NOTE - OPERATION/FINDINGS
Direct, suprapubic left inguinal hernia repaired via robot-assisted transabdominal preperitoneal approach. Hemostasis achieved.

## 2022-02-15 NOTE — BRIEF OPERATIVE NOTE - NSICDXBRIEFPROCEDURE_GEN_ALL_CORE_FT
PROCEDURES:  Robot-assisted laparoscopic repair of inguinal hernia with insertion of mesh using da Mulugeta Xi 15-Feb-2022 13:24:40  Kranthi Romo

## 2022-02-15 NOTE — ASU DISCHARGE PLAN (ADULT/PEDIATRIC) - CARE PROVIDER_API CALL
Roosevelt Chatman)  Surgery  Bariatric  [address] [place] [address] [id number]  Phone: [phone]  Fax: [phone]  Follow Up Time: 2 weeks

## 2022-02-22 LAB — SURGICAL PATHOLOGY STUDY: SIGNIFICANT CHANGE UP

## 2022-03-03 ENCOUNTER — APPOINTMENT (OUTPATIENT)
Dept: SURGERY | Facility: CLINIC | Age: 52
End: 2022-03-03
Payer: MEDICAID

## 2022-03-03 VITALS
OXYGEN SATURATION: 96 % | HEART RATE: 82 BPM | TEMPERATURE: 97.5 F | SYSTOLIC BLOOD PRESSURE: 131 MMHG | BODY MASS INDEX: 32.14 KG/M2 | WEIGHT: 200 LBS | RESPIRATION RATE: 16 BRPM | DIASTOLIC BLOOD PRESSURE: 88 MMHG | HEIGHT: 66 IN

## 2022-03-03 PROCEDURE — 99024 POSTOP FOLLOW-UP VISIT: CPT

## 2022-03-03 NOTE — ASSESSMENT
[FreeTextEntry1] : Mr. RODRIGUEZ is a 51 year old man who presented with left inguinal hernia s/p robotic left inguinal hernia repair with mesh on 2/15/22, doing well

## 2022-03-03 NOTE — PHYSICAL EXAM
[JVD] : no jugular venous distention  [No Rash or Lesion] : No rash or lesion [Alert] : alert [Oriented to Person] : oriented to person [Oriented to Place] : oriented to place [Oriented to Time] : oriented to time [Calm] : calm [de-identified] : No acute distress [de-identified] : No respiratory distress [de-identified] : Regular rate [de-identified] : soft, nontender. no rebound or guarding. incisions c/d/i [de-identified] : normal range of motion

## 2022-03-03 NOTE — HISTORY OF PRESENT ILLNESS
[de-identified] : Mr. RODRIGUEZ is a 51 year old man who presented with left inguinal hernia s/p robotic left inguinal hernia repair with mesh on 2/15/22 who comes in today for postop visit. He is doing well. Denies pain. Denies fever/chills. No redness or pain at incision sites. Tolerating diet. Normal bowel movements.

## 2022-05-10 ENCOUNTER — APPOINTMENT (OUTPATIENT)
Dept: FAMILY MEDICINE | Facility: CLINIC | Age: 52
End: 2022-05-10
Payer: MEDICAID

## 2022-05-10 VITALS
OXYGEN SATURATION: 97 % | DIASTOLIC BLOOD PRESSURE: 82 MMHG | TEMPERATURE: 98 F | HEART RATE: 80 BPM | HEIGHT: 66 IN | BODY MASS INDEX: 32.14 KG/M2 | WEIGHT: 200 LBS | SYSTOLIC BLOOD PRESSURE: 130 MMHG | RESPIRATION RATE: 12 BRPM

## 2022-05-10 DIAGNOSIS — R22.31 LOCALIZED SWELLING, MASS AND LUMP, RIGHT UPPER LIMB: ICD-10-CM

## 2022-05-10 DIAGNOSIS — R10.2 PELVIC AND PERINEAL PAIN: ICD-10-CM

## 2022-05-10 DIAGNOSIS — Z01.818 ENCOUNTER FOR OTHER PREPROCEDURAL EXAMINATION: ICD-10-CM

## 2022-05-10 DIAGNOSIS — E66.9 OBESITY, UNSPECIFIED: ICD-10-CM

## 2022-05-10 PROCEDURE — 99214 OFFICE O/P EST MOD 30 MIN: CPT

## 2022-05-12 PROBLEM — E66.9 OBESITY (BMI 30-39.9): Status: RESOLVED | Noted: 2017-12-12 | Resolved: 2022-05-12

## 2022-05-12 PROBLEM — Z01.818 PREOPERATIVE CLEARANCE: Status: RESOLVED | Noted: 2022-02-10 | Resolved: 2022-05-12

## 2022-05-12 PROBLEM — R10.2 PELVIC PAIN: Status: RESOLVED | Noted: 2021-08-27 | Resolved: 2022-05-12

## 2022-05-12 PROBLEM — R10.2 SUPRAPUBIC PAIN: Status: RESOLVED | Noted: 2021-02-11 | Resolved: 2022-05-12

## 2022-05-12 PROBLEM — R22.31 MASS OF FINGER OF RIGHT HAND: Status: RESOLVED | Noted: 2020-06-02 | Resolved: 2022-05-12

## 2022-05-12 NOTE — HISTORY OF PRESENT ILLNESS
[FreeTextEntry8] : This is a 51 y/o male with PMHx significant for chronic low back pain, GERD, HLD, Obesity, Vitamin D insufficiency, presenting c/o episode of abdominal pain / discomfort x 2 days. Pt went to urgent care yesterday with no diagnosis, pain disappeared after taking a soup and OTC medication for "stomach"

## 2022-05-12 NOTE — ASSESSMENT
[FreeTextEntry1] : This is a 49 y/o male with PMHx significant for chronic low back pain, GERD, HLD, Obesity, Vitamin D insufficiency, presenting for c/o abdominal discomfort\par \par GI: Abdominal discomfort\par -Physical exam benign\par -Symptoms resolved after 2 -3 days\par -Omeprazole / Sodium Bicarb tablets x 2 weeks\par -Dietary changes discussed.\par \par Obesity/ Pre-DM:\par - A1c with Estimated Average Glucose 5.9 / 123 in November.\par -Weight loss encouraged\par -Dietary changes discussed again.\par \par CVS: HLD\par -Lipid panel showed , Total Cholesterol 207, Trig 70, HDL 67.\par -Advised low fat/low cholesterol diet and weight loss.\par \par F/E/N: Vitamin D insufficiency.\par -Will check Vitamin D.\par \par HCM:\par -EKG 12/12/2017\par -Depression screening: negative.\par -Pt refused Flu vaccine.\par -Tdap: 12//12/2017\par -HIV testing offered: he declined testing 12/12/2017\par -Covid-19 vaccine completed -PFIZER 4/29/21, 5/20/21\par

## 2022-05-12 NOTE — HEALTH RISK ASSESSMENT
[Yes] : Yes [2 - 4 times a month (2 pts)] : 2-4 times a month (2 points) [1 or 2 (0 pts)] : 1 or 2 (0 points) [Never (0 pts)] : Never (0 points) [No] : In the past 12 months have you used drugs other than those required for medical reasons? No [No falls in past year] : Patient reported no falls in the past year [0] : 2) Feeling down, depressed, or hopeless: Not at all (0) [de-identified] : occasional whiskey 1 glass every week [Audit-CScore] : 2 [de-identified] : None [de-identified] : Regular [[id number]] : 0

## 2022-09-23 ENCOUNTER — APPOINTMENT (OUTPATIENT)
Dept: FAMILY MEDICINE | Facility: CLINIC | Age: 52
End: 2022-09-23

## 2022-09-23 VITALS
DIASTOLIC BLOOD PRESSURE: 92 MMHG | TEMPERATURE: 98.6 F | RESPIRATION RATE: 12 BRPM | SYSTOLIC BLOOD PRESSURE: 128 MMHG | HEART RATE: 84 BPM | OXYGEN SATURATION: 98 % | BODY MASS INDEX: 31.82 KG/M2 | HEIGHT: 66 IN | WEIGHT: 198 LBS

## 2022-09-23 DIAGNOSIS — E55.9 VITAMIN D DEFICIENCY, UNSPECIFIED: ICD-10-CM

## 2022-09-23 PROCEDURE — 36415 COLL VENOUS BLD VENIPUNCTURE: CPT

## 2022-09-23 PROCEDURE — 99214 OFFICE O/P EST MOD 30 MIN: CPT | Mod: 25

## 2022-09-23 RX ORDER — OMEPRAZOLE, SODIUM BICARBONATE 40; 1100 MG/1; MG/1
40-1100 CAPSULE ORAL TWICE DAILY
Qty: 14 | Refills: 0 | Status: COMPLETED | COMMUNITY
Start: 2022-05-10 | End: 2022-09-23

## 2022-09-23 NOTE — HEALTH RISK ASSESSMENT
[Yes] : Yes [2 - 4 times a month (2 pts)] : 2-4 times a month (2 points) [1 or 2 (0 pts)] : 1 or 2 (0 points) [Never (0 pts)] : Never (0 points) [No] : In the past 12 months have you used drugs other than those required for medical reasons? No [No falls in past year] : Patient reported no falls in the past year [0] : 2) Feeling down, depressed, or hopeless: Not at all (0) [Never] : Never [PHQ-2 Negative - No further assessment needed] : PHQ-2 Negative - No further assessment needed [de-identified] : occasional whiskey 1 glass every week [Audit-CScore] : 2 [de-identified] : None [de-identified] : Regular [[id number]] : 0

## 2022-09-23 NOTE — HISTORY OF PRESENT ILLNESS
[FreeTextEntry8] : This is a 53 y/o male with PMHx significant for chronic low back pain, GERD, HLD, Obesity, Vitamin D insufficiency, presenting c/o repetitive suprapubic pain which he has been complaining for the past 2 years on / off, already worked up with no findings. Pt states that the discomfort is 4/10 with no aggravating or alleviating factors.

## 2022-09-23 NOTE — ASSESSMENT
[FreeTextEntry1] : This is a 51 y/o male with PMHx significant for chronic low back pain, GERD, HLD, Obesity, Vitamin D insufficiency, presenting for c/o SUPRAPUBIC PAIN\par \par GI/: h/o Constipation, c/o chronic suprapubic pain.\par -Continue Miralax PRN\par -Dietary changes discussed.\par -Check bladder US\par -Pelvic MRI wo contrast\par \par Obesity/ Pre-DM:\par - A1c with Estimated Average Glucose 5.9 / 123 in November, repeat today\par -Weight loss encouraged\par -Dietary changes discussed again.\par \par CVS: HLD\par -Lipid panel showed , Total Cholesterol 207, Trig 70, HDL 67.\par -Fasting blood work in house today\par -Advised low fat/low cholesterol diet and weight loss.\par \par F/E/N: Vitamin D insufficiency, Obesity\par -Will check Vitamin D.\par \par HCM:\par -Fasting BW in house today\par -EKG 12/12/2017\par -Depression screening: negative.\par -Pt refused Flu vaccine.\par -Tdap: 12//12/2017\par -HIV testing offered: he declined testing 12/12/2017\par -Covid-19 vaccine completed -PFIZER 4/29/21, 5/20/21\par

## 2022-09-23 NOTE — PHYSICAL EXAM
[Normal] : normal rate, regular rhythm, normal S1 and S2 and no murmur heard [Soft] : abdomen soft [Non-distended] : non-distended [No Masses] : no abdominal mass palpated [Normal Bowel Sounds] : normal bowel sounds [de-identified] : There is pain to deep palpation about the pubic symphysis and the area directly above, no signs of trauma, no G/R/R

## 2022-10-03 LAB
25(OH)D3 SERPL-MCNC: 29.2 NG/ML
ALBUMIN SERPL ELPH-MCNC: 4.6 G/DL
ALP BLD-CCNC: 55 U/L
ALT SERPL-CCNC: 18 U/L
ANION GAP SERPL CALC-SCNC: 12 MMOL/L
AST SERPL-CCNC: 18 U/L
BILIRUB SERPL-MCNC: 0.4 MG/DL
BUN SERPL-MCNC: 10 MG/DL
CALCIUM SERPL-MCNC: 9.6 MG/DL
CHLORIDE SERPL-SCNC: 104 MMOL/L
CHOLEST SERPL-MCNC: 202 MG/DL
CO2 SERPL-SCNC: 23 MMOL/L
CREAT SERPL-MCNC: 1.02 MG/DL
EGFR: 89 ML/MIN/1.73M2
ESTIMATED AVERAGE GLUCOSE: 126 MG/DL
GLUCOSE SERPL-MCNC: 108 MG/DL
HBA1C MFR BLD HPLC: 6 %
HDLC SERPL-MCNC: 64 MG/DL
LDLC SERPL CALC-MCNC: 119 MG/DL
NONHDLC SERPL-MCNC: 138 MG/DL
POTASSIUM SERPL-SCNC: 4.1 MMOL/L
PROT SERPL-MCNC: 7 G/DL
SODIUM SERPL-SCNC: 140 MMOL/L
TRIGL SERPL-MCNC: 95 MG/DL

## 2022-10-18 ENCOUNTER — APPOINTMENT (OUTPATIENT)
Dept: MRI IMAGING | Facility: CLINIC | Age: 52
End: 2022-10-18

## 2022-10-18 ENCOUNTER — OUTPATIENT (OUTPATIENT)
Dept: OUTPATIENT SERVICES | Facility: HOSPITAL | Age: 52
LOS: 1 days | End: 2022-10-18

## 2022-10-18 ENCOUNTER — APPOINTMENT (OUTPATIENT)
Dept: ULTRASOUND IMAGING | Facility: CLINIC | Age: 52
End: 2022-10-18

## 2022-10-18 DIAGNOSIS — R10.2 PELVIC AND PERINEAL PAIN: ICD-10-CM

## 2022-10-18 DIAGNOSIS — Z98.890 OTHER SPECIFIED POSTPROCEDURAL STATES: Chronic | ICD-10-CM

## 2022-10-18 PROCEDURE — 76857 US EXAM PELVIC LIMITED: CPT | Mod: 26

## 2022-10-18 PROCEDURE — 72195 MRI PELVIS W/O DYE: CPT | Mod: 26

## 2022-12-02 NOTE — BRIEF OPERATIVE NOTE - NSICDXBRIEFOPLAUNCH_GEN_ALL_CORE
Mary Llanos,   I saw this sweet lady this morning for her routine surveillance after breast cancer. We had a long chat about the loss of her  during her chemotherapy treatment. Patient mentioned she thinks she might be ready for group therapy pertaining to spousal loss. I wanted to reach out to you if you new of any good options for this sort of thing.  Thanks in advance for any help!  Giovana 
<--- Click to Launch ICDx for PreOp, PostOp and Procedure

## 2023-02-03 ENCOUNTER — APPOINTMENT (OUTPATIENT)
Dept: FAMILY MEDICINE | Facility: CLINIC | Age: 53
End: 2023-02-03
Payer: MEDICAID

## 2023-02-03 ENCOUNTER — NON-APPOINTMENT (OUTPATIENT)
Age: 53
End: 2023-02-03

## 2023-02-03 VITALS
WEIGHT: 200 LBS | BODY MASS INDEX: 32.14 KG/M2 | OXYGEN SATURATION: 99 % | HEART RATE: 90 BPM | DIASTOLIC BLOOD PRESSURE: 84 MMHG | TEMPERATURE: 98.2 F | RESPIRATION RATE: 16 BRPM | SYSTOLIC BLOOD PRESSURE: 130 MMHG | HEIGHT: 66 IN

## 2023-02-03 DIAGNOSIS — R07.89 OTHER CHEST PAIN: ICD-10-CM

## 2023-02-03 DIAGNOSIS — M25.512 PAIN IN LEFT SHOULDER: ICD-10-CM

## 2023-02-03 DIAGNOSIS — K59.00 CONSTIPATION, UNSPECIFIED: ICD-10-CM

## 2023-02-03 PROCEDURE — 93000 ELECTROCARDIOGRAM COMPLETE: CPT

## 2023-02-03 PROCEDURE — 83036 HEMOGLOBIN GLYCOSYLATED A1C: CPT | Mod: QW

## 2023-02-03 PROCEDURE — 99214 OFFICE O/P EST MOD 30 MIN: CPT | Mod: 25

## 2023-02-06 PROBLEM — M25.512 ACUTE PAIN OF LEFT SHOULDER: Status: ACTIVE | Noted: 2023-02-06

## 2023-02-06 PROBLEM — K59.00 CONSTIPATION, UNSPECIFIED CONSTIPATION TYPE: Status: ACTIVE | Noted: 2020-11-17

## 2023-02-06 NOTE — HEALTH RISK ASSESSMENT
[Yes] : Yes [2 - 4 times a month (2 pts)] : 2-4 times a month (2 points) [1 or 2 (0 pts)] : 1 or 2 (0 points) [Never (0 pts)] : Never (0 points) [No] : In the past 12 months have you used drugs other than those required for medical reasons? No [No falls in past year] : Patient reported no falls in the past year [0] : 2) Feeling down, depressed, or hopeless: Not at all (0) [PHQ-2 Negative - No further assessment needed] : PHQ-2 Negative - No further assessment needed [Never] : Never [de-identified] : occasional whiskey 1 glass every week [Audit-CScore] : 2 [de-identified] : None [de-identified] : Regular [[id number]] : 0

## 2023-02-06 NOTE — HISTORY OF PRESENT ILLNESS
[FreeTextEntry8] : This is a 51 y/o male with PMHx significant for chronic low back pain, GERD, HLD, Obesity, Vitamin D insufficiency, presenting c/o LEFT arm and shoulder pain radiating into upper left chest on / off for 2 days, last episode 3 days ago, concomitant with RIGHT sided headache. Pt did not take any OTC medications for it, Denies any SOB, palpitations. Pt denies any heavy lifting or trauma to the arm. Pt currently pain free.

## 2023-02-06 NOTE — REVIEW OF SYSTEMS
[Chest Pain] : chest pain [Joint Pain] : joint pain [Negative] : Neurological [FreeTextEntry9] : LEFT Shoulder pain

## 2023-02-06 NOTE — PHYSICAL EXAM
[Normal] : normal rate, regular rhythm, normal S1 and S2 and no murmur heard [No Carotid Bruits] : no carotid bruits [Soft] : abdomen soft [Non Tender] : non-tender [Non-distended] : non-distended [Normal Bowel Sounds] : normal bowel sounds [No CVA Tenderness] : no CVA  tenderness [No Spinal Tenderness] : no spinal tenderness [No Rash] : no rash [de-identified] : LEFT shoulder with minimal decrease in ROM secondary to pain

## 2023-02-06 NOTE — ASSESSMENT
[FreeTextEntry1] : This is a 49 y/o male with PMHx significant for chronic low back pain, GERD, HLD, Obesity, Vitamin D insufficiency, presenting for c/o LEFT shoulder and chest pain\par \par MSK: LEFT shoulder pain\par -LEFT shoulder Xray\par -OTC NSAIDs for pain\par \par GI/: h/o Constipation, c/o chronic suprapubic pain.\par -Continue Miralax PRN, Omeprazole 40 mg daily prn\par -Dietary changes discussed.\par -Normal bladder US 10/22\par -Pelvic MRI wo contrast unremarkable 10/22\par \par Obesity/ Pre-DM:\par - A1c 5.9  --> 6.0 --> 5.7 POCT today.\par -Weight loss encouraged\par -Dietary changes discussed again.\par \par CVS: HLD, Atypical chest pain\par -Lipid panel showed , Total Cholesterol 207, Trig 70, HDL 67.\par -Advised low fat/low cholesterol diet and weight loss.\par \par F/E/N: Vitamin D insufficiency, Obesity\par -Will check Vitamin D.\par \par HCM:\par -Depression screening: negative.\par -Pt refused Flu vaccine.\par -Tdap: 12//12/2017\par -HIV testing offered: he declined testing 12/12/2017\par -Covid-19 vaccine completed -PFIZER 4/29/21, 5/20/21\par

## 2023-04-25 ENCOUNTER — APPOINTMENT (OUTPATIENT)
Dept: FAMILY MEDICINE | Facility: CLINIC | Age: 53
End: 2023-04-25

## 2023-04-25 VITALS
TEMPERATURE: 98 F | WEIGHT: 206 LBS | DIASTOLIC BLOOD PRESSURE: 80 MMHG | BODY MASS INDEX: 33.11 KG/M2 | SYSTOLIC BLOOD PRESSURE: 126 MMHG | HEART RATE: 93 BPM | HEIGHT: 66 IN | OXYGEN SATURATION: 97 % | RESPIRATION RATE: 16 BRPM

## 2023-06-01 ENCOUNTER — APPOINTMENT (OUTPATIENT)
Dept: FAMILY MEDICINE | Facility: CLINIC | Age: 53
End: 2023-06-01
Payer: MEDICAID

## 2023-06-01 VITALS
RESPIRATION RATE: 16 BRPM | BODY MASS INDEX: 32.47 KG/M2 | HEART RATE: 85 BPM | HEIGHT: 66 IN | WEIGHT: 202 LBS | OXYGEN SATURATION: 97 % | DIASTOLIC BLOOD PRESSURE: 86 MMHG | TEMPERATURE: 98.2 F | SYSTOLIC BLOOD PRESSURE: 128 MMHG

## 2023-06-01 PROCEDURE — 99214 OFFICE O/P EST MOD 30 MIN: CPT

## 2023-06-01 RX ORDER — NAPROXEN 500 MG/1
500 TABLET ORAL
Qty: 30 | Refills: 1 | Status: COMPLETED | COMMUNITY
Start: 2021-08-27 | End: 2023-06-01

## 2023-06-02 NOTE — ASSESSMENT
[FreeTextEntry1] : This is a 51 y/o male with PMHx significant for chronic low back pain, GERD, HLD, Obesity, Vitamin D insufficiency, presenting c/o low abdominal discomfort.\par \par GI/: h/o Constipation, chronic suprapubic pain p/w periumbilical pain\par -Likely muscular in nature, since improved, no need for further work up, if it continues returns, will get GI involved\par -Restart, Omeprazole 40 mg daily x 14 days, then prn\par -Dietary changes discussed.\par -Normal bladder US 10/22\par -Pelvic MRI wo contrast unremarkable 10/22\par \par Obesity/ Pre-DM:\par - A1c 5.9  --> 6.0 --> 5.7\par -Weight loss encouraged, has lost 4 Lbs since end of April\par -Dietary changes discussed again.\par \par CVS: HLD, Atypical chest pain\par -Lipid panel showed , Total Cholesterol 207, Trig 70, HDL 67.\par -Advised low fat/low cholesterol diet and weight loss.\par \par F/E/N: Vitamin D insufficiency, Obesity\par -Continue Vitamin D supplementation.\par \par HCM:\par -Depression screening: negative.\par -Pt refused Flu vaccine.\par -Tdap: 12//12/2017\par -HIV testing offered: he declined testing 12/12/2017\par -Covid-19 vaccine completed -PFIZER 4/29/21, 5/20/21\par -Colonoscopy normal 4/6/21 with Dr Bianchi, recommend follow up 2026

## 2023-06-02 NOTE — HISTORY OF PRESENT ILLNESS
[FreeTextEntry8] : This is a 51 y/o male with PMHx significant for chronic low back pain, GERD, HLD, Obesity, Vitamin D insufficiency, presenting c/o 2 week h/o periumbilical pain on / off lasting up to 2 hours rated 6/10 with a sudden cramp-like sensation that lasted about 30 seconds and then the pain stopped. Pt denies any N/V or changes in BM. Pt states that he took 4 days straight with MiraLax.

## 2023-06-02 NOTE — PHYSICAL EXAM
[Normal] : normal rate, regular rhythm, normal S1 and S2 and no murmur heard [No Carotid Bruits] : no carotid bruits [Soft] : abdomen soft [Non-distended] : non-distended [Normal Bowel Sounds] : normal bowel sounds [No CVA Tenderness] : no CVA  tenderness [No Spinal Tenderness] : no spinal tenderness [No Rash] : no rash [de-identified] : inferior periumbilical tenderness to deep palpation, no masses, no signs of trauma or infection [de-identified] : LEFT shoulder with minimal decrease in ROM secondary to pain

## 2023-06-02 NOTE — HEALTH RISK ASSESSMENT
[Yes] : Yes [2 - 4 times a month (2 pts)] : 2-4 times a month (2 points) [1 or 2 (0 pts)] : 1 or 2 (0 points) [Never (0 pts)] : Never (0 points) [No] : In the past 12 months have you used drugs other than those required for medical reasons? No [No falls in past year] : Patient reported no falls in the past year [0] : 2) Feeling down, depressed, or hopeless: Not at all (0) [PHQ-2 Negative - No further assessment needed] : PHQ-2 Negative - No further assessment needed [Never] : Never [de-identified] : occasional whiskey 1 glass every week [Audit-CScore] : 2 [de-identified] : None [de-identified] : Regular [[id number]] : 0

## 2023-06-28 ENCOUNTER — NON-APPOINTMENT (OUTPATIENT)
Age: 53
End: 2023-06-28

## 2023-07-05 ENCOUNTER — APPOINTMENT (OUTPATIENT)
Dept: FAMILY MEDICINE | Facility: CLINIC | Age: 53
End: 2023-07-05
Payer: MEDICAID

## 2023-07-05 VITALS
RESPIRATION RATE: 16 BRPM | BODY MASS INDEX: 32.47 KG/M2 | HEART RATE: 82 BPM | DIASTOLIC BLOOD PRESSURE: 82 MMHG | TEMPERATURE: 98.1 F | WEIGHT: 202 LBS | HEIGHT: 66 IN | SYSTOLIC BLOOD PRESSURE: 140 MMHG | OXYGEN SATURATION: 98 %

## 2023-07-05 DIAGNOSIS — G89.29 PELVIC AND PERINEAL PAIN: ICD-10-CM

## 2023-07-05 DIAGNOSIS — R10.9 UNSPECIFIED ABDOMINAL PAIN: ICD-10-CM

## 2023-07-05 DIAGNOSIS — R10.2 PELVIC AND PERINEAL PAIN: ICD-10-CM

## 2023-07-05 PROCEDURE — 99214 OFFICE O/P EST MOD 30 MIN: CPT

## 2023-07-06 PROBLEM — R10.2 CHRONIC SUPRAPUBIC PAIN: Status: ACTIVE | Noted: 2022-09-23

## 2023-07-06 PROBLEM — R10.9 ABDOMINAL DISCOMFORT: Status: ACTIVE | Noted: 2020-08-19

## 2023-07-06 NOTE — HEALTH RISK ASSESSMENT
[Yes] : Yes [2 - 4 times a month (2 pts)] : 2-4 times a month (2 points) [1 or 2 (0 pts)] : 1 or 2 (0 points) [Never (0 pts)] : Never (0 points) [No] : In the past 12 months have you used drugs other than those required for medical reasons? No [No falls in past year] : Patient reported no falls in the past year [0] : 2) Feeling down, depressed, or hopeless: Not at all (0) [PHQ-2 Negative - No further assessment needed] : PHQ-2 Negative - No further assessment needed [Never] : Never [With Patient/Caregiver] : , with patient/caregiver [Reviewed no changes] : Reviewed, no changes [Aggressive treatment] : aggressive treatment [de-identified] : occasional whiskey 1 glass every week [Audit-CScore] : 2 [de-identified] : None [de-identified] : Regular [[id number]] : 0 [AdvancecareDate] : 07/23

## 2023-07-06 NOTE — HISTORY OF PRESENT ILLNESS
[FreeTextEntry8] : This is a 51 y/o male with PMHx significant for chronic low back pain, GERD, HLD, Obesity, Vitamin D insufficiency, presenting c/o 2 week h/o periumbilical pain on / off lasting up to 2 hours rated 6/10 with a sudden cramp-like sensation that lasted about 30 seconds and then the pain stopped. Pt denies any N/V or changes in BM. Pt states that he took 4 days straight with MiraLax. [FreeTextEntry1] : abdominal pain [de-identified] : This is a 54 y/o male with PMHx significant for chronic low back pain, GERD, HLD, Obesity, Vitamin D insufficiency, presenting after going to ER last week secondary abdominal pain which had been complained since last visit. Pt states that he had worsening pain, went to [facility] was worked up and found to have no abnormalities with CT scan of A/P and blood work / Urinalysis, was told likely MSK in nature which was my prior assessment when he came originally. Pt states that since he received the contrast for the CT scan, the pain has resolved completely, no further pain or change on BM or voiding.

## 2023-07-06 NOTE — PHYSICAL EXAM
[Normal] : normal rate, regular rhythm, normal S1 and S2 and no murmur heard [No Carotid Bruits] : no carotid bruits [Soft] : abdomen soft [Non-distended] : non-distended [Normal Bowel Sounds] : normal bowel sounds [No CVA Tenderness] : no CVA  tenderness [No Spinal Tenderness] : no spinal tenderness [No Rash] : no rash [de-identified] : Benign abdominal exam [de-identified] : LEFT shoulder with minimal decrease in ROM secondary to pain

## 2023-07-06 NOTE — ASSESSMENT
[FreeTextEntry1] : This is a 52 y/o male with PMHx significant for chronic low back pain, GERD, HLD, Obesity, Vitamin D insufficiency, presenting after ED visit for abdominal / pelvic discomfort.\par \par GI/: h/o Constipation, chronic suprapubic pain p/w periumbilical pain\par -Muscular in nature, since improved, no need for further work up.\par -Dietary changes discussed.\par -Normal bladder US 10/22\par -Pelvic MRI wo contrast unremarkable 10/22\par \par Obesity/ Pre-DM:\par - A1c 5.9  --> 6.0 --> 5.7\par -Weight loss encouraged, has lost 4 Lbs since  April\par -Dietary changes discussed again.\par \par CVS: HLD, Atypical chest pain\par -Lipid panel showed , Total Cholesterol 207, Trig 70, HDL 67.\par -Advised low fat/low cholesterol diet and weight loss.\par \par F/E/N: Vitamin D insufficiency, Obesity\par -Continue Vitamin D supplementation.\par \par HCM:\par -Depression screening: negative.\par -Pt refused Flu vaccine.\par -Tdap: 12//12/2017\par -HIV testing offered: he declined testing 12/12/2017\par -Covid-19 vaccine completed -PFIZER 4/29/21, 5/20/21\par -Colonoscopy normal 4/6/21 with Dr Bianchi, recommend follow up 2026

## 2023-08-02 ENCOUNTER — APPOINTMENT (OUTPATIENT)
Dept: FAMILY MEDICINE | Facility: CLINIC | Age: 53
End: 2023-08-02
Payer: MEDICAID

## 2023-08-02 VITALS
BODY MASS INDEX: 32.62 KG/M2 | OXYGEN SATURATION: 98 % | HEART RATE: 95 BPM | HEIGHT: 66 IN | TEMPERATURE: 98.3 F | RESPIRATION RATE: 14 BRPM | SYSTOLIC BLOOD PRESSURE: 130 MMHG | WEIGHT: 203 LBS | DIASTOLIC BLOOD PRESSURE: 80 MMHG

## 2023-08-02 DIAGNOSIS — Z00.00 ENCOUNTER FOR GENERAL ADULT MEDICAL EXAMINATION W/OUT ABNORMAL FINDINGS: ICD-10-CM

## 2023-08-02 DIAGNOSIS — Z12.5 ENCOUNTER FOR SCREENING FOR MALIGNANT NEOPLASM OF PROSTATE: ICD-10-CM

## 2023-08-02 DIAGNOSIS — H54.7 UNSPECIFIED VISUAL LOSS: ICD-10-CM

## 2023-08-02 PROCEDURE — 99396 PREV VISIT EST AGE 40-64: CPT

## 2023-08-02 NOTE — HISTORY OF PRESENT ILLNESS
[FreeTextEntry1] : physical exam [de-identified] : This is a 54 y/o male with PMHx significant for chronic low back pain, GERD, HLD, Obesity, Vitamin D insufficiency, presenting for CPE. Pt with no acute complains

## 2023-08-02 NOTE — ASSESSMENT
[FreeTextEntry1] : This is a 52 y/o male with PMHx significant for chronic low back pain, GERD, HLD, Obesity, Vitamin D insufficiency, presenting for CPE  GI/: h/o Constipation, chronic suprapubic pain p/w periumbilical pain -resolved -Dietary changes discussed. -Normal bladder US 10/22 -Pelvic MRI wo contrast unremarkable 10/22  Obesity/ Pre-DM: - A1c 5.9  --> 6.0 --> 5.7 -Weight loss encouraged. -Dietary changes discussed again.  CVS: HLD, Atypical chest pain -Advised low fat/low cholesterol diet and weight loss. -Fasting lipids as outpt  F/E/N: Vitamin D insufficiency, Obesity -Continue Vitamin D supplementation.  HCM: -Blood work as outpt -Depression screening: negative. -Pt refused Flu vaccine. -Tdap: 12//12/2017 -HIV testing offered: he declined testing 12/12/2017 -Covid-19 vaccine completed -PFIZER 4/29/21, 5/20/21 -Colonoscopy normal 4/6/21 with Dr Bianchi, recommend follow up 2026

## 2023-08-02 NOTE — COUNSELING
[Fall prevention counseling provided] : Fall prevention counseling provided [Adequate lighting] : Adequate lighting [No throw rugs] : No throw rugs [Use proper foot wear] : Use proper foot wear [Behavioral health counseling provided] : Behavioral health counseling provided [Sleep ___ hours/day] : Sleep [unfilled] hours/day [Yes] : Risk of tobacco use and health benefits of smoking cessation discussed: Yes [AUDIT-C Screening administered and reviewed] : AUDIT-C Screening administered and reviewed [Benefits of weight loss discussed] : Benefits of weight loss discussed [Encouraged to increase physical activity] : Encouraged to increase physical activity [Decrease Portions] : decrease portions [____ min/wk Activity] : [unfilled] min/wk activity [None] : None [Good understanding] : Patient has a good understanding of lifestyle changes and steps needed to achieve self management goal

## 2023-08-02 NOTE — HEALTH RISK ASSESSMENT
[Good] : ~his/her~  mood as  good [Yes] : Yes [2 - 4 times a month (2 pts)] : 2-4 times a month (2 points) [1 or 2 (0 pts)] : 1 or 2 (0 points) [Never (0 pts)] : Never (0 points) [No] : In the past 12 months have you used drugs other than those required for medical reasons? No [No falls in past year] : Patient reported no falls in the past year [0] : 2) Feeling down, depressed, or hopeless: Not at all (0) [PHQ-2 Negative - No further assessment needed] : PHQ-2 Negative - No further assessment needed [de-identified] : occasional whiskey 1 glass every week [Audit-CScore] : 2 [de-identified] : None [de-identified] : Regular [[id number]] : 0 [HIV test declined] : HIV test declined [Hepatitis C test declined] : Hepatitis C test declined [Change in mental status noted] : No change in mental status noted [Language] : denies difficulty with language [Behavior] : denies difficulty with behavior [Learning/Retaining New Information] : denies difficulty learning/retaining new information [Handling Complex Tasks] : denies difficulty handling complex tasks [Reasoning] : denies difficulty with reasoning [Spatial Ability and Orientation] : denies difficulty with spatial ability and orientation [None] : None [With Family] : lives with family [# of Members in Household ___] :  household currently consist of [unfilled] member(s) [Employed] : employed [High School] : high school [] :  [# Of Children ___] : has [unfilled] children [Sexually Active] : sexually active [High Risk Behavior] : no high risk behavior [Feels Safe at Home] : Feels safe at home [Neglect Or Abandonment] : neglect or abandonment [Fully functional (bathing, dressing, toileting, transferring, walking, feeding)] : Fully functional (bathing, dressing, toileting, transferring, walking, feeding) [Fully functional (using the telephone, shopping, preparing meals, housekeeping, doing laundry, using] : Fully functional and needs no help or supervision to perform IADLs (using the telephone, shopping, preparing meals, housekeeping, doing laundry, using transportation, managing medications and managing finances) [Reports changes in hearing] : Reports no changes in hearing [Reports changes in vision] : Reports no changes in vision [Reports normal functional visual acuity (ie: able to read med bottle)] : Reports poor functional visual acuity.  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

## 2023-09-22 ENCOUNTER — APPOINTMENT (OUTPATIENT)
Dept: FAMILY MEDICINE | Facility: CLINIC | Age: 53
End: 2023-09-22
Payer: MEDICAID

## 2023-09-22 VITALS
RESPIRATION RATE: 14 BRPM | TEMPERATURE: 97.8 F | HEART RATE: 78 BPM | HEIGHT: 66 IN | DIASTOLIC BLOOD PRESSURE: 82 MMHG | BODY MASS INDEX: 32.62 KG/M2 | OXYGEN SATURATION: 98 % | WEIGHT: 203 LBS | SYSTOLIC BLOOD PRESSURE: 122 MMHG

## 2023-09-22 DIAGNOSIS — Z23 ENCOUNTER FOR IMMUNIZATION: ICD-10-CM

## 2023-09-22 PROCEDURE — 99214 OFFICE O/P EST MOD 30 MIN: CPT | Mod: 25

## 2023-09-22 PROCEDURE — 83036 HEMOGLOBIN GLYCOSYLATED A1C: CPT | Mod: QW

## 2023-09-29 NOTE — ASU PREOP CHECKLIST - WEIGHT IN KG
Daily Note     Today's date: 2023  Patient name: Carlene Villaseñor  : 1946  MRN: [id number]  Referring provider: Karen Tolentino, *  Dx:   Encounter Diagnosis     ICD-10-CM    1. Left hip pain  M25.552       2. Chronic right shoulder pain  M25.511     G89.29                      Subjective: pt notes that she has had a big change in her shoulder pain since last visit and notes that the foam rolling has been very helpful. Also notes that she started a course of steroids for her hip yesterday and thinks that this is feeling better as well. Objective: See treatment diary below      Assessment:  Pt did have less ttp of the left gluteal today. We were able to add in seated and supine piriformis stretch in supine and taught but didn't perform in sitting as well. Taught in sitting secondary to upcoming drive to [place] did instruct pt in foam rolling for left gluteal for trip as well. Plan: Continue per plan of care. Progress note during next visit.       Precautions: none      Manuals          Right infraspinatus stm DB DB DB          Left gluteal stm DB DB DB DB                                   Neuro Re-Ed             Bridge band nv No band 2x8           Bridge ball nv nv x10          Dead bug nv nv           Sit to stand band nv nv           Hamstring iso   5''x10                                    Ther Ex             S/l abd(shld) nv 1# x10 0# x10           S/l flex to H abd nv 1# x10           S/l Er nv 2# 2x10           shld row nv            Supinated shld ext nv            clam    x10         Figure 4 str  15''x5           Hip IR s/l  D/c pn           S/l hip abd  2x10           Piriformis phase 1 and 2    15''x5 ea         Foam rolling     gluteals 3'         Gait Training             HEP   DB                       Modalities 88

## 2023-12-01 ENCOUNTER — RX CHANGE (OUTPATIENT)
Age: 53
End: 2023-12-01

## 2023-12-01 DIAGNOSIS — K21.9 GASTRO-ESOPHAGEAL REFLUX DISEASE W/OUT ESOPHAGITIS: ICD-10-CM

## 2023-12-06 ENCOUNTER — APPOINTMENT (OUTPATIENT)
Dept: FAMILY MEDICINE | Facility: CLINIC | Age: 53
End: 2023-12-06
Payer: MEDICAID

## 2023-12-06 VITALS
SYSTOLIC BLOOD PRESSURE: 124 MMHG | BODY MASS INDEX: 31.98 KG/M2 | WEIGHT: 199 LBS | HEIGHT: 66 IN | HEART RATE: 96 BPM | TEMPERATURE: 97.7 F | DIASTOLIC BLOOD PRESSURE: 78 MMHG | OXYGEN SATURATION: 98 % | RESPIRATION RATE: 15 BRPM

## 2023-12-06 DIAGNOSIS — R73.03 PREDIABETES.: ICD-10-CM

## 2023-12-06 DIAGNOSIS — M54.50 LOW BACK PAIN, UNSPECIFIED: ICD-10-CM

## 2023-12-06 DIAGNOSIS — L72.9 FOLLICULAR CYST OF THE SKIN AND SUBCUTANEOUS TISSUE, UNSPECIFIED: ICD-10-CM

## 2023-12-06 PROCEDURE — 99214 OFFICE O/P EST MOD 30 MIN: CPT

## 2023-12-06 RX ORDER — METHOCARBAMOL 500 MG/1
500 TABLET, FILM COATED ORAL
Qty: 15 | Refills: 2 | Status: COMPLETED | COMMUNITY
Start: 2023-09-22 | End: 2023-12-06

## 2023-12-06 RX ORDER — LORATADINE 10 MG
17 TABLET,DISINTEGRATING ORAL
Refills: 0 | Status: COMPLETED | COMMUNITY
Start: 2020-11-17 | End: 2023-12-06

## 2023-12-06 RX ORDER — DICLOFENAC SODIUM 75 MG/1
75 TABLET, DELAYED RELEASE ORAL EVERY 8 HOURS
Qty: 15 | Refills: 2 | Status: COMPLETED | COMMUNITY
Start: 2023-09-22 | End: 2023-12-06

## 2023-12-08 PROBLEM — M54.50 ACUTE MIDLINE LOW BACK PAIN WITHOUT SCIATICA: Status: ACTIVE | Noted: 2023-09-22

## 2023-12-08 PROBLEM — R73.03 PREDIABETES: Status: ACTIVE | Noted: 2020-06-02

## 2024-01-27 ENCOUNTER — OFFICE (OUTPATIENT)
Dept: URBAN - METROPOLITAN AREA CLINIC 103 | Facility: CLINIC | Age: 54
Setting detail: OPHTHALMOLOGY
End: 2024-01-27
Payer: COMMERCIAL

## 2024-01-27 VITALS — HEIGHT: 60 IN

## 2024-01-27 DIAGNOSIS — H40.033: ICD-10-CM

## 2024-01-27 DIAGNOSIS — H52.7: ICD-10-CM

## 2024-01-27 DIAGNOSIS — H02.831: ICD-10-CM

## 2024-01-27 DIAGNOSIS — H02.834: ICD-10-CM

## 2024-01-27 PROCEDURE — 92004 COMPRE OPH EXAM NEW PT 1/>: CPT | Performed by: OPHTHALMOLOGY

## 2024-01-27 PROCEDURE — 92015 DETERMINE REFRACTIVE STATE: CPT | Performed by: OPHTHALMOLOGY

## 2024-01-27 PROCEDURE — 92020 GONIOSCOPY: CPT | Performed by: OPHTHALMOLOGY

## 2024-01-27 ASSESSMENT — REFRACTION_MANIFEST
OS_SPHERE: +1.75
OS_SPHERE: +0.75
OS_VA1: 20/20
OS_CYLINDER: SPHERE
OS_ADD: +1.75
OD_SPHERE: +1.00
OS_CYLINDER: SPHERE
OD_CYLINDER: -1.50
OS_VA1: 20/20
OD_CYLINDER: SPHERE
OD_SPHERE: +2.00
OD_VA1: 20/20
OS_ADD: +1.75
OD_ADD: +1.75
OD_ADD: +1.75
OD_AXIS: 092
OD_VA1: 20/20

## 2024-01-27 ASSESSMENT — REFRACTION_AUTOREFRACTION
OD_SPHERE: +2.25
OS_SPHERE: +1.75
OD_AXIS: 097
OD_CYLINDER: -0.50
OS_CYLINDER: SPHERE

## 2024-01-27 ASSESSMENT — REFRACTION_CURRENTRX
OS_VPRISM_DIRECTION: SV
OS_SPHERE: +1.50
OD_SPHERE: +1.50
OS_OVR_VA: 20/
OS_CYLINDER: SPHERE
OD_CYLINDER: SPHERE
OD_OVR_VA: 20/
OD_VPRISM_DIRECTION: SV

## 2024-01-27 ASSESSMENT — CONFRONTATIONAL VISUAL FIELD TEST (CVF)
OS_FINDINGS: FULL
OD_FINDINGS: FULL

## 2024-01-27 ASSESSMENT — SPHEQUIV_DERIVED
OD_SPHEQUIV: 1.25
OD_SPHEQUIV: 2

## 2024-01-27 ASSESSMENT — LID POSITION - DERMATOCHALASIS
OD_DERMATOCHALASIS: 1+
OS_DERMATOCHALASIS: 1+

## 2024-02-13 DIAGNOSIS — M79.643 PAIN IN UNSPECIFIED HAND: ICD-10-CM

## 2024-02-13 DIAGNOSIS — M79.646 PAIN IN UNSPECIFIED HAND: ICD-10-CM

## 2024-02-14 ENCOUNTER — APPOINTMENT (OUTPATIENT)
Dept: ORTHOPEDIC SURGERY | Facility: CLINIC | Age: 54
End: 2024-02-14
Payer: MEDICAID

## 2024-02-14 VITALS
HEIGHT: 67 IN | WEIGHT: 195 LBS | SYSTOLIC BLOOD PRESSURE: 124 MMHG | BODY MASS INDEX: 30.61 KG/M2 | HEART RATE: 87 BPM | DIASTOLIC BLOOD PRESSURE: 78 MMHG

## 2024-02-14 DIAGNOSIS — R22.31 LOCALIZED SWELLING, MASS AND LUMP, RIGHT UPPER LIMB: ICD-10-CM

## 2024-02-14 DIAGNOSIS — M79.646 PAIN IN UNSPECIFIED FINGER(S): ICD-10-CM

## 2024-02-14 PROCEDURE — 20612 ASPIRATE/INJ GANGLION CYST: CPT | Mod: F6

## 2024-02-14 PROCEDURE — 73130 X-RAY EXAM OF HAND: CPT | Mod: RT

## 2024-02-14 PROCEDURE — 99204 OFFICE O/P NEW MOD 45 MIN: CPT | Mod: 25

## 2024-02-14 NOTE — PHYSICAL EXAM
[de-identified] : Examination of the right index finger reveals a dorsal sided mass ballotable consistent with a ganglion cyst most likely. [de-identified] : [4] views of [bilateral hands and wrists] were obtained today in my office and were seen by me and discussed with the patient.  These [show findings consistent with bilateral basal joint OA and findings of IP joint OA]

## 2024-02-14 NOTE — HISTORY OF PRESENT ILLNESS
[FreeTextEntry1] : Ajay is a 53-year-old male who presents today with a chronic history of right hand pain specifically the right index finger with a dorsal sided mass.

## 2024-02-14 NOTE — ASSESSMENT
[FreeTextEntry1] : ASSESSMENT: The patient comes in today with chronic exacerbated findings symptoms of hand pain finger pain specifically at the right index finger with a dorsal sided mass we have discussed activity modification and modalities such as observation.  He elects for an aspiration understanding the high risk of recurrence   The patient was adequately and thoroughly informed of my assessment of their current condition(s).  - This may diminish bodily function for the extremity. We discussed prognosis, tx modalities including operative and nonoperative options for the above diagnostic assessment. As always, 2nd opinion is always provided as an option.  When accessible, I was able to review other physicians note(s) including reviewing other tests, imaging results as well as personally view these results for my own interpretation.   Aspiration Consent right index dorsal mass  The risks and benefits of a [ganglion cyst aspiration] were discussed in detail.  The risks include but are not limited to: pain, infection, swelling, flare response, bleeding.  The risk of incomplete resolution of symptoms, recurrence and additional intervention was reviewed and considered by the patient.  The patient agreed to proceed and under a sterile prep, I aspirated several cc of mucinous fluid.  The patient tolerated the injection well. The patient was adequately and thoroughly informed of my assessment of their current condition(s).  DISCUSSION: 1.  Aspiration as above.  Activity modification.  Follow-up as needed 2. [x] 3. [x]

## 2024-02-23 ENCOUNTER — NON-APPOINTMENT (OUTPATIENT)
Age: 54
End: 2024-02-23

## 2024-02-26 ENCOUNTER — RX RENEWAL (OUTPATIENT)
Age: 54
End: 2024-02-26

## 2024-02-26 RX ORDER — OMEPRAZOLE 40 MG/1
40 CAPSULE, DELAYED RELEASE ORAL
Qty: 90 | Refills: 0 | Status: ACTIVE | COMMUNITY
Start: 2017-12-12 | End: 1900-01-01

## 2024-05-19 NOTE — ASU DISCHARGE PLAN (ADULT/PEDIATRIC) - ACCOMPANIED BY
Bed: G13  Expected date: 5/19/24  Expected time: 5:08 AM  Means of arrival: [facility] ([id number])  Comments:  64 M found otuside  Motorized wheelchair  +ETOH  126/74 97% 75 16   Family

## 2024-05-25 ENCOUNTER — TRANSCRIPTION ENCOUNTER (OUTPATIENT)
Age: 54
End: 2024-05-25

## 2024-05-31 ENCOUNTER — APPOINTMENT (OUTPATIENT)
Dept: FAMILY MEDICINE | Facility: CLINIC | Age: 54
End: 2024-05-31
Payer: COMMERCIAL

## 2024-05-31 VITALS
RESPIRATION RATE: 16 BRPM | BODY MASS INDEX: 30.76 KG/M2 | DIASTOLIC BLOOD PRESSURE: 74 MMHG | WEIGHT: 196 LBS | HEIGHT: 67 IN | OXYGEN SATURATION: 98 % | SYSTOLIC BLOOD PRESSURE: 126 MMHG | TEMPERATURE: 97.7 F | HEART RATE: 74 BPM

## 2024-05-31 DIAGNOSIS — Z77.098 CONTACT WITH AND (SUSPECTED) EXPOSURE TO OTHER HAZARDOUS, CHIEFLY NONMEDICINAL, CHEMICALS: ICD-10-CM

## 2024-05-31 DIAGNOSIS — H93.8X9 OTHER SPECIFIED DISORDERS OF EAR, UNSPECIFIED EAR: ICD-10-CM

## 2024-05-31 DIAGNOSIS — M25.50 PAIN IN UNSPECIFIED JOINT: ICD-10-CM

## 2024-05-31 DIAGNOSIS — E78.5 HYPERLIPIDEMIA, UNSPECIFIED: ICD-10-CM

## 2024-05-31 DIAGNOSIS — H93.8X3 OTHER SPECIFIED DISORDERS OF EAR, BILATERAL: ICD-10-CM

## 2024-05-31 PROCEDURE — G2211 COMPLEX E/M VISIT ADD ON: CPT | Mod: NC,1L

## 2024-05-31 PROCEDURE — 99214 OFFICE O/P EST MOD 30 MIN: CPT

## 2024-05-31 RX ORDER — AZELASTINE HYDROCHLORIDE 137 UG/1
137 SPRAY, METERED NASAL
Qty: 1 | Refills: 0 | Status: ACTIVE | COMMUNITY
Start: 2024-05-31 | End: 1900-01-01

## 2024-05-31 RX ORDER — ALBUTEROL SULFATE 90 UG/1
108 (90 BASE) INHALANT RESPIRATORY (INHALATION)
Qty: 1 | Refills: 3 | Status: ACTIVE | COMMUNITY
Start: 2024-05-31 | End: 1900-01-01

## 2024-05-31 NOTE — ASSESSMENT
[FreeTextEntry1] :  54 y/o male with PMHx significant for chronic low back pain, GERD, HLD, Obesity, Vitamin D insufficiency, presenting s/p ED visit and Obs stay for chemical inhalation.  PULM: Chemical exposure -Pulmonary referral provided -CXR from [facility] reviewed, [facility]  MSK: Lower back pain -Renewed Naproxen 500 mg bid prn -Methocarbamol 500 mg at bedtime  -Obesity/ Pre-DM: - A1c 5.9 --> 6.0 --> 5.7 --> 5.9 -Weight loss encouraged. -Dietary changes discussed again.  CVS: HLD -Advised low fat/low cholesterol diet and weight loss. -Chest pain resolved  F/E/N: Vitamin D insufficiency, Obesity -Continue Vitamin D supplementation. -Weight loss recommended  HCM: -Depression screening: negative. -Pt refused Flu vaccine. -Tdap: 12//12/2017 -HIV testing offered: he declined testing 12/12/2017 -Covid-19 vaccine completed -PFIZER 4/29/21, 5/20/21 -Colonoscopy normal 4/6/21 with Dr Bianchi, recommend follow up 2026.

## 2024-05-31 NOTE — HISTORY OF PRESENT ILLNESS
[FreeTextEntry1] : Follow up [de-identified] : This is a 54 y/o male with PMHx significant for chronic low back pain, GERD, HLD, Obesity, Vitamin D insufficiency, presenting s/p ED visit and hospital stay for observation secondary to breathing exposure to Muriatic Acid at work. Pt was at work and after inhaling some Muriatic Acid, became SOB and chest pressure, 911 was called and taking to [facility]. Pt states that he was placed under observation and recommended to follow up with Pulmonary specialist for further evaluation. Pt states that he is still having some SOB at times and feels that he cannot get enough air when he takes deep breaths.

## 2024-05-31 NOTE — PHYSICAL EXAM
[Normal] : normal rate, regular rhythm, normal S1 and S2 and no murmur heard [de-identified] : Decreased air intake, CTA bilaterally,. [de-identified] : obese

## 2024-05-31 NOTE — HEALTH RISK ASSESSMENT
[Yes] : Yes [2 - 4 times a month (2 pts)] : 2-4 times a month (2 points) [1 or 2 (0 pts)] : 1 or 2 (0 points) [Never (0 pts)] : Never (0 points) [No] : In the past 12 months have you used drugs other than those required for medical reasons? No [No falls in past year] : Patient reported no falls in the past year [0] : 2) Feeling down, depressed, or hopeless: Not at all (0) [PHQ-2 Negative - No further assessment needed] : PHQ-2 Negative - No further assessment needed [With Patient/Caregiver] : , with patient/caregiver [Reviewed no changes] : Reviewed, no changes [Aggressive treatment] : aggressive treatment [Never] : Never [de-identified] : occasional whiskey 1 glass every week [Audit-CScore] : 2 [de-identified] : None [de-identified] : Regular [[id number]] : 0 [AdvancecareDate] : 07/23

## 2024-06-07 ENCOUNTER — APPOINTMENT (OUTPATIENT)
Dept: PULMONOLOGY | Facility: CLINIC | Age: 54
End: 2024-06-07
Payer: COMMERCIAL

## 2024-06-07 VITALS
BODY MASS INDEX: 30.45 KG/M2 | SYSTOLIC BLOOD PRESSURE: 124 MMHG | OXYGEN SATURATION: 97 % | RESPIRATION RATE: 16 BRPM | DIASTOLIC BLOOD PRESSURE: 76 MMHG | WEIGHT: 194 LBS | HEIGHT: 67 IN | HEART RATE: 91 BPM

## 2024-06-07 DIAGNOSIS — G47.33 OBSTRUCTIVE SLEEP APNEA (ADULT) (PEDIATRIC): ICD-10-CM

## 2024-06-07 DIAGNOSIS — E66.9 OBESITY, UNSPECIFIED: ICD-10-CM

## 2024-06-07 DIAGNOSIS — R06.02 SHORTNESS OF BREATH: ICD-10-CM

## 2024-06-07 DIAGNOSIS — Z86.16 PERSONAL HISTORY OF COVID-19: ICD-10-CM

## 2024-06-07 PROCEDURE — 94729 DIFFUSING CAPACITY: CPT

## 2024-06-07 PROCEDURE — 85018 HEMOGLOBIN: CPT | Mod: QW

## 2024-06-07 PROCEDURE — 99204 OFFICE O/P NEW MOD 45 MIN: CPT | Mod: 25

## 2024-06-07 PROCEDURE — 94727 GAS DIL/WSHOT DETER LNG VOL: CPT

## 2024-06-07 PROCEDURE — 94010 BREATHING CAPACITY TEST: CPT

## 2024-06-07 RX ORDER — NAPROXEN 500 MG/1
500 TABLET ORAL
Qty: 30 | Refills: 1 | Status: DISCONTINUED | COMMUNITY
Start: 2024-05-31 | End: 2024-06-07

## 2024-06-07 NOTE — END OF VISIT
[Time Spent: ___ minutes] : I have spent [unfilled] minutes of time on the encounter. [TextEntry] : Discussed with pt at length regarding soboe, inhalation exposure, prior Covid infection, ruthie, obesity; reviewed w/u with pt as above.

## 2024-06-07 NOTE — CONSULT LETTER
[Dear  ___] : Dear  [unfilled], [Consult Letter:] : I had the pleasure of evaluating your patient, [unfilled]. [Please see my note below.] : Please see my note below. [Consult Closing:] : Thank you very much for allowing me to participate in the care of this patient.  If you have any questions, please do not hesitate to contact me. [Sincerely,] : Sincerely, [FreeTextEntry3] : Ben Gage MD, FCCP, D. [facility] [place]

## 2024-06-07 NOTE — HISTORY OF PRESENT ILLNESS
[Excessive Daytime Sleepiness] : excessive daytime sleepiness [Snoring] : snoring [Unrefreshing Sleep] : unrefreshing sleep [Sleepy When Sedentary] : sleepy when sedentary [Initial Evaluation] : an initial evaluation of [Excess Weight] : excess weight [Currently Experiencing] : The patient is currently experiencing symptoms. [Dyspnea] : dyspnea [Low Calorie Diet] : low calorie diet [Fair Compliance] : fair compliance with treatment [Fair Tolerance] : fair tolerance of treatment [Poor Symptom Control] : poor symptom control [High] : high [Low Calorie] : low calorie [Well Balanced Diet] : well balanced meals [None] : The patient does not exercise [TextBox_4] : Never smoker. S/p Covid infection in 2020 with mild symptoms. Pt reports chemical exposure and possible inhalation injury. Pt was seen in the hospital but reports negative evaluation. Pt reports increased SOB since that time but without other symptoms.   [Witnessed Apnea During Sleep] : no witnessed apnea during sleep [Witnessed Gasping During Sleep] : no witnessed gasping during sleep [ESS] : 8

## 2024-06-07 NOTE — REVIEW OF SYSTEMS
[Fatigue] : fatigue [SOB on Exertion] : sob on exertion [GERD] : gerd [Back Pain] : back pain [Obesity] : obesity [Negative] : Psychiatric [TextBox_69] : On PPI

## 2024-06-07 NOTE — DISCUSSION/SUMMARY
[FreeTextEntry1] :  #1. PFTs on 6/7/24 were normal. #2. The patient does not appear to require chronic BD therapy at this time. #3. Diet and exercise for weight loss. #4. SOBOE is likely at least somewhat related to weight or deconditioning.  #5. CXR to evaluate SOBOE and chemical inhalation though pt reports that he was evaluated in the hospital and w/u was unremarkable. Consider CT if needed but given normal PFTs, there does not appear to have been any significant damage from the isolated exposure. #6. Home PSG to evaluate for possible SANDRA. #7. Consider PAP therapy for significant SANDRA. #8. S/p Covid vaccines and boosters. #9. F/u in 8 weeks with HST and CXR.  The patient expressed understanding and agreement with the above recommendations/plan and accepts responsibility to be compliant with recommended testing, therapies, and f/u visits. All relevant questions and concerns were addressed.

## 2024-06-07 NOTE — REASON FOR VISIT
[Initial] : an initial visit [[employer] ] : provided by [employer]   [Time Spent: ____ minutes] : Total time spent using  services: [unfilled] minutes. The patient's primary language is not English thus required  services. [Sleep Apnea] : sleep apnea [Shortness of Breath] : shortness of breath [Obesity] : obesity [TextBox_44] : chemical inhalation [Interpreters_IDNumber] : [id number] [Interpreters_FullName] : Nino [TWNoteComboBox1] : Malaysian

## 2024-06-24 ENCOUNTER — RX CHANGE (OUTPATIENT)
Age: 54
End: 2024-06-24

## 2024-06-24 DIAGNOSIS — J30.2 OTHER SEASONAL ALLERGIC RHINITIS: ICD-10-CM

## 2024-08-05 ENCOUNTER — APPOINTMENT (OUTPATIENT)
Dept: PULMONOLOGY | Facility: CLINIC | Age: 54
End: 2024-08-05

## 2024-08-05 NOTE — BRIEF OPERATIVE NOTE - IV INFUSIONS - CRYSTALLOIDS
Pt sliced left little toe yesterday. Pt was at [facility], told to go to ER for further evaluation.   
1000 cc LR

## 2024-08-27 ENCOUNTER — APPOINTMENT (OUTPATIENT)
Dept: PULMONOLOGY | Facility: CLINIC | Age: 54
End: 2024-08-27

## 2024-11-11 ENCOUNTER — APPOINTMENT (OUTPATIENT)
Dept: FAMILY MEDICINE | Facility: CLINIC | Age: 54
End: 2024-11-11
Payer: COMMERCIAL

## 2024-11-11 VITALS
OXYGEN SATURATION: 98 % | TEMPERATURE: 97.6 F | HEIGHT: 67 IN | RESPIRATION RATE: 16 BRPM | WEIGHT: 197 LBS | BODY MASS INDEX: 30.92 KG/M2 | HEART RATE: 76 BPM | SYSTOLIC BLOOD PRESSURE: 128 MMHG | DIASTOLIC BLOOD PRESSURE: 82 MMHG

## 2024-11-11 DIAGNOSIS — E66.9 OBESITY, UNSPECIFIED: ICD-10-CM

## 2024-11-11 DIAGNOSIS — M54.50 LOW BACK PAIN, UNSPECIFIED: ICD-10-CM

## 2024-11-11 DIAGNOSIS — K21.9 GASTRO-ESOPHAGEAL REFLUX DISEASE W/OUT ESOPHAGITIS: ICD-10-CM

## 2024-11-11 DIAGNOSIS — E78.5 HYPERLIPIDEMIA, UNSPECIFIED: ICD-10-CM

## 2024-11-11 PROCEDURE — 99214 OFFICE O/P EST MOD 30 MIN: CPT

## 2024-11-11 PROCEDURE — G2211 COMPLEX E/M VISIT ADD ON: CPT | Mod: NC

## 2024-11-11 RX ORDER — PANTOPRAZOLE 40 MG/1
40 TABLET, DELAYED RELEASE ORAL
Qty: 30 | Refills: 1 | Status: ACTIVE | COMMUNITY
Start: 2024-11-11 | End: 1900-01-01

## 2024-11-11 RX ORDER — LIDOCAINE 5% 700 MG/1
5 PATCH TOPICAL
Qty: 2 | Refills: 0 | Status: ACTIVE | COMMUNITY
Start: 2024-11-11 | End: 1900-01-01

## 2024-11-16 ENCOUNTER — APPOINTMENT (OUTPATIENT)
Dept: RADIOLOGY | Facility: CLINIC | Age: 54
End: 2024-11-16
Payer: COMMERCIAL

## 2024-11-16 ENCOUNTER — OUTPATIENT (OUTPATIENT)
Dept: OUTPATIENT SERVICES | Facility: HOSPITAL | Age: 54
LOS: 1 days | End: 2024-11-16
Payer: COMMERCIAL

## 2024-11-16 DIAGNOSIS — Z98.890 OTHER SPECIFIED POSTPROCEDURAL STATES: Chronic | ICD-10-CM

## 2024-11-16 DIAGNOSIS — Z00.8 ENCOUNTER FOR OTHER GENERAL EXAMINATION: ICD-10-CM

## 2024-11-16 PROCEDURE — 72114 X-RAY EXAM L-S SPINE BENDING: CPT

## 2024-11-16 PROCEDURE — 72114 X-RAY EXAM L-S SPINE BENDING: CPT | Mod: 26

## 2024-12-10 ENCOUNTER — RX CHANGE (OUTPATIENT)
Age: 54
End: 2024-12-10

## 2024-12-12 ENCOUNTER — APPOINTMENT (OUTPATIENT)
Dept: FAMILY MEDICINE | Facility: CLINIC | Age: 54
End: 2024-12-12

## 2024-12-16 ENCOUNTER — RX RENEWAL (OUTPATIENT)
Age: 54
End: 2024-12-16

## 2025-05-02 ENCOUNTER — APPOINTMENT (OUTPATIENT)
Dept: FAMILY MEDICINE | Facility: CLINIC | Age: 55
End: 2025-05-02
Payer: COMMERCIAL

## 2025-05-02 VITALS
SYSTOLIC BLOOD PRESSURE: 112 MMHG | TEMPERATURE: 97.5 F | RESPIRATION RATE: 14 BRPM | HEIGHT: 67 IN | DIASTOLIC BLOOD PRESSURE: 82 MMHG | WEIGHT: 192 LBS | HEART RATE: 83 BPM | BODY MASS INDEX: 30.13 KG/M2 | OXYGEN SATURATION: 98 %

## 2025-05-02 DIAGNOSIS — M25.541 PAIN IN JOINTS OF RIGHT HAND: ICD-10-CM

## 2025-05-02 DIAGNOSIS — M79.643 PAIN IN UNSPECIFIED HAND: ICD-10-CM

## 2025-05-02 DIAGNOSIS — M25.542 PAIN IN JOINTS OF RIGHT HAND: ICD-10-CM

## 2025-05-02 DIAGNOSIS — G47.33 OBSTRUCTIVE SLEEP APNEA (ADULT) (PEDIATRIC): ICD-10-CM

## 2025-05-02 DIAGNOSIS — M25.50 PAIN IN UNSPECIFIED JOINT: ICD-10-CM

## 2025-05-02 DIAGNOSIS — M26.623 ARTHRALGIA OF BILATERAL TEMPOROMANDIBULAR JOINT: ICD-10-CM

## 2025-05-02 DIAGNOSIS — E78.5 HYPERLIPIDEMIA, UNSPECIFIED: ICD-10-CM

## 2025-05-02 DIAGNOSIS — R73.03 PREDIABETES.: ICD-10-CM

## 2025-05-02 DIAGNOSIS — E66.9 OBESITY, UNSPECIFIED: ICD-10-CM

## 2025-05-02 DIAGNOSIS — M79.646 PAIN IN UNSPECIFIED HAND: ICD-10-CM

## 2025-05-02 PROCEDURE — G2211 COMPLEX E/M VISIT ADD ON: CPT | Mod: NC

## 2025-05-02 PROCEDURE — 83036 HEMOGLOBIN GLYCOSYLATED A1C: CPT | Mod: QW

## 2025-05-02 PROCEDURE — 99214 OFFICE O/P EST MOD 30 MIN: CPT

## 2025-05-02 PROCEDURE — 36415 COLL VENOUS BLD VENIPUNCTURE: CPT

## 2025-05-02 RX ORDER — METHYLPREDNISOLONE 4 MG/1
4 TABLET ORAL
Qty: 1 | Refills: 0 | Status: ACTIVE | COMMUNITY
Start: 2025-05-02 | End: 1900-01-01

## 2025-05-05 LAB
ALBUMIN SERPL ELPH-MCNC: 4.5 G/DL
ALP BLD-CCNC: 52 U/L
ALT SERPL-CCNC: 21 U/L
ANION GAP SERPL CALC-SCNC: 12 MMOL/L
AST SERPL-CCNC: 18 U/L
BILIRUB SERPL-MCNC: 0.8 MG/DL
BUN SERPL-MCNC: 10 MG/DL
CALCIUM SERPL-MCNC: 9 MG/DL
CHLORIDE SERPL-SCNC: 107 MMOL/L
CO2 SERPL-SCNC: 22 MMOL/L
CREAT SERPL-MCNC: 1.11 MG/DL
CRP SERPL-MCNC: 3 MG/L
EGFRCR SERPLBLD CKD-EPI 2021: 79 ML/MIN/1.73M2
ERYTHROCYTE [SEDIMENTATION RATE] IN BLOOD BY WESTERGREN METHOD: 7 MM/HR
GLUCOSE SERPL-MCNC: 102 MG/DL
POTASSIUM SERPL-SCNC: 3.8 MMOL/L
PROT SERPL-MCNC: 6.8 G/DL
RHEUMATOID FACT SER QL: <10 IU/ML
SODIUM SERPL-SCNC: 141 MMOL/L

## 2025-05-13 ENCOUNTER — APPOINTMENT (OUTPATIENT)
Dept: FAMILY MEDICINE | Facility: CLINIC | Age: 55
End: 2025-05-13
Payer: COMMERCIAL

## 2025-05-13 VITALS
HEIGHT: 67 IN | BODY MASS INDEX: 29.98 KG/M2 | HEART RATE: 76 BPM | WEIGHT: 191 LBS | SYSTOLIC BLOOD PRESSURE: 130 MMHG | DIASTOLIC BLOOD PRESSURE: 80 MMHG | OXYGEN SATURATION: 97 % | RESPIRATION RATE: 12 BRPM

## 2025-05-13 DIAGNOSIS — Z00.00 ENCOUNTER FOR GENERAL ADULT MEDICAL EXAMINATION W/OUT ABNORMAL FINDINGS: ICD-10-CM

## 2025-05-13 PROCEDURE — 36415 COLL VENOUS BLD VENIPUNCTURE: CPT

## 2025-05-13 PROCEDURE — 99396 PREV VISIT EST AGE 40-64: CPT

## 2025-05-14 LAB
25(OH)D3 SERPL-MCNC: 30.9 NG/ML
ALBUMIN SERPL ELPH-MCNC: 4.6 G/DL
ALP BLD-CCNC: 52 U/L
ALT SERPL-CCNC: 21 U/L
ANION GAP SERPL CALC-SCNC: 14 MMOL/L
APPEARANCE: CLEAR
AST SERPL-CCNC: 19 U/L
BACTERIA: NEGATIVE /HPF
BILIRUB SERPL-MCNC: 0.8 MG/DL
BILIRUBIN URINE: NEGATIVE
BLOOD URINE: NEGATIVE
BUN SERPL-MCNC: 10 MG/DL
CALCIUM SERPL-MCNC: 9.2 MG/DL
CAST: 0 /LPF
CHLORIDE SERPL-SCNC: 104 MMOL/L
CHOLEST SERPL-MCNC: 209 MG/DL
CO2 SERPL-SCNC: 23 MMOL/L
COLOR: YELLOW
CREAT SERPL-MCNC: 0.97 MG/DL
EGFRCR SERPLBLD CKD-EPI 2021: 93 ML/MIN/1.73M2
EPITHELIAL CELLS: 0 /HPF
ESTIMATED AVERAGE GLUCOSE: 123 MG/DL
GLUCOSE QUALITATIVE U: NEGATIVE MG/DL
GLUCOSE SERPL-MCNC: 96 MG/DL
HBA1C MFR BLD HPLC: 5.9 %
HCT VFR BLD CALC: 45.4 %
HDLC SERPL-MCNC: 74 MG/DL
HGB BLD-MCNC: 14.9 G/DL
KETONES URINE: NEGATIVE MG/DL
LDLC SERPL-MCNC: 124 MG/DL
LEUKOCYTE ESTERASE URINE: ABNORMAL
MCHC RBC-ENTMCNC: 29.6 PG
MCHC RBC-ENTMCNC: 32.8 G/DL
MCV RBC AUTO: 90.1 FL
MICROSCOPIC-UA: NORMAL
NITRITE URINE: NEGATIVE
NONHDLC SERPL-MCNC: 135 MG/DL
PH URINE: 8
PLATELET # BLD AUTO: 168 K/UL
POTASSIUM SERPL-SCNC: 4.3 MMOL/L
PROT SERPL-MCNC: 6.9 G/DL
PROTEIN URINE: NORMAL MG/DL
PSA SERPL-MCNC: 0.5 NG/ML
RBC # BLD: 5.04 M/UL
RBC # FLD: 13.4 %
RED BLOOD CELLS URINE: 0 /HPF
SODIUM SERPL-SCNC: 141 MMOL/L
SPECIFIC GRAVITY URINE: 1.02
TESTOST SERPL-MCNC: 424 NG/DL
TRIGL SERPL-MCNC: 62 MG/DL
TSH SERPL-ACNC: 0.77 UIU/ML
UROBILINOGEN URINE: 1 MG/DL
WBC # FLD AUTO: 4.99 K/UL
WHITE BLOOD CELLS URINE: 0 /HPF

## 2025-05-19 ENCOUNTER — NON-APPOINTMENT (OUTPATIENT)
Age: 55
End: 2025-05-19

## 2025-05-21 ENCOUNTER — OUTPATIENT (OUTPATIENT)
Dept: OUTPATIENT SERVICES | Facility: HOSPITAL | Age: 55
LOS: 1 days | End: 2025-05-21
Payer: COMMERCIAL

## 2025-05-21 ENCOUNTER — APPOINTMENT (OUTPATIENT)
Dept: CT IMAGING | Facility: CLINIC | Age: 55
End: 2025-05-21
Payer: COMMERCIAL

## 2025-05-21 DIAGNOSIS — Z98.890 OTHER SPECIFIED POSTPROCEDURAL STATES: Chronic | ICD-10-CM

## 2025-05-21 DIAGNOSIS — R10.2 PELVIC AND PERINEAL PAIN: ICD-10-CM

## 2025-05-21 DIAGNOSIS — Z00.8 ENCOUNTER FOR OTHER GENERAL EXAMINATION: ICD-10-CM

## 2025-05-21 PROCEDURE — 72192 CT PELVIS W/O DYE: CPT | Mod: 26

## 2025-05-21 PROCEDURE — 72192 CT PELVIS W/O DYE: CPT

## 2025-06-02 RX ORDER — METHOCARBAMOL 500 MG/1
500 TABLET, FILM COATED ORAL
Qty: 14 | Refills: 0 | Status: ACTIVE | COMMUNITY
Start: 2025-06-02 | End: 1900-01-01

## 2025-06-18 ENCOUNTER — OFFICE (OUTPATIENT)
Dept: URBAN - METROPOLITAN AREA CLINIC 103 | Facility: CLINIC | Age: 55
Setting detail: OPHTHALMOLOGY
End: 2025-06-18
Payer: COMMERCIAL

## 2025-06-18 DIAGNOSIS — H02.834: ICD-10-CM

## 2025-06-18 DIAGNOSIS — H02.831: ICD-10-CM

## 2025-06-18 DIAGNOSIS — H52.7: ICD-10-CM

## 2025-06-18 DIAGNOSIS — H40.033: ICD-10-CM

## 2025-06-18 PROCEDURE — 92020 GONIOSCOPY: CPT | Performed by: OPHTHALMOLOGY

## 2025-06-18 PROCEDURE — 92015 DETERMINE REFRACTIVE STATE: CPT | Performed by: OPHTHALMOLOGY

## 2025-06-18 PROCEDURE — 92014 COMPRE OPH EXAM EST PT 1/>: CPT | Performed by: OPHTHALMOLOGY

## 2025-06-18 ASSESSMENT — REFRACTION_MANIFEST
OD_CYLINDER: SPHERE
OS_ADD: +1.75
OS_VA1: 20/20
OD_CYLINDER: SPHERE
OS_VA1: 20/20
OD_VA1: 20/20
OS_CYLINDER: SPHERE
OD_ADD: +2.00
OD_ADD: +1.75
OS_SPHERE: +1.50
OS_CYLINDER: SPHERE
OD_SPHERE: +1.00
OD_SPHERE: +1.75
OS_SPHERE: +0.75
OS_ADD: +2.00
OD_VA1: 20/20

## 2025-06-18 ASSESSMENT — KERATOMETRY
OD_K2POWER_DIOPTERS: 44.00
OS_K1POWER_DIOPTERS: 43.25
OS_K2POWER_DIOPTERS: 44.25
OS_AXISANGLE_DEGREES: 094
OD_AXISANGLE_DEGREES: 073
OD_K1POWER_DIOPTERS: 43.50

## 2025-06-18 ASSESSMENT — REFRACTION_CURRENTRX
OS_OVR_VA: 20/
OD_OVR_VA: 20/
OS_SPHERE: +1.50
OS_CYLINDER: SPHERE
OD_VPRISM_DIRECTION: SV
OD_CYLINDER: SPHERE
OD_SPHERE: +1.50
OS_VPRISM_DIRECTION: SV

## 2025-06-18 ASSESSMENT — VISUAL ACUITY
OS_BCVA: 20/80-1
OD_BCVA: 20/70

## 2025-06-18 ASSESSMENT — CONFRONTATIONAL VISUAL FIELD TEST (CVF)
OS_FINDINGS: FULL
OD_FINDINGS: FULL

## 2025-06-18 ASSESSMENT — LID POSITION - DERMATOCHALASIS
OD_DERMATOCHALASIS: 1+
OS_DERMATOCHALASIS: 1+

## 2025-06-18 ASSESSMENT — REFRACTION_AUTOREFRACTION
OD_CYLINDER: SPH
OS_CYLINDER: SPHERE
OS_SPHERE: +1.75
OD_SPHERE: +1.75

## 2025-06-18 ASSESSMENT — TONOMETRY
OD_IOP_MMHG: 15
OS_IOP_MMHG: 15

## (undated) DEVICE — SUT MONOCRYL 4-0 27" PS-2 UNDYED

## (undated) DEVICE — SUT VICRYL 0 27" UR-6

## (undated) DEVICE — SUT VLOC 180 3-0 6" V-20 GREEN

## (undated) DEVICE — XI SEAL UNIV 5- 8 MM

## (undated) DEVICE — XI OBTURATOR OPTICAL BLADELESS 8MM

## (undated) DEVICE — ELCTR CORD FOOTSWITCH 1PLR LAPSCP 10FT

## (undated) DEVICE — PACK ROBOTIC

## (undated) DEVICE — DRAPE MAJOR ABDOMINAL W POUCHES

## (undated) DEVICE — SI DRAPE ARM

## (undated) DEVICE — XI DRAPE COLUMN

## (undated) DEVICE — DRAPE SHEET XL 77X98"

## (undated) DEVICE — COVER TIP INTUITIVE W INSERTION TOOL

## (undated) DEVICE — DRAPE TOWEL BLUE STICKY

## (undated) DEVICE — TUBING STRYKEFLOW II SUCTION / IRRIGATOR